# Patient Record
Sex: FEMALE | Race: WHITE | NOT HISPANIC OR LATINO | Employment: FULL TIME | URBAN - METROPOLITAN AREA
[De-identification: names, ages, dates, MRNs, and addresses within clinical notes are randomized per-mention and may not be internally consistent; named-entity substitution may affect disease eponyms.]

---

## 2017-08-02 ENCOUNTER — HOSPITAL ENCOUNTER (OUTPATIENT)
Dept: RADIOLOGY | Facility: HOSPITAL | Age: 52
Discharge: HOME | End: 2017-08-02
Payer: COMMERCIAL

## 2017-08-02 ENCOUNTER — OFFICE VISIT (OUTPATIENT)
Dept: FAMILY MEDICINE | Facility: CLINIC | Age: 52
End: 2017-08-02
Payer: COMMERCIAL

## 2017-08-02 VITALS
WEIGHT: 136.38 LBS | OXYGEN SATURATION: 95 % | BODY MASS INDEX: 23.28 KG/M2 | TEMPERATURE: 100 F | HEIGHT: 64 IN | HEART RATE: 113 BPM | DIASTOLIC BLOOD PRESSURE: 56 MMHG | SYSTOLIC BLOOD PRESSURE: 118 MMHG

## 2017-08-02 DIAGNOSIS — J40 BRONCHITIS: ICD-10-CM

## 2017-08-02 DIAGNOSIS — R35.0 FREQUENT URINATION: ICD-10-CM

## 2017-08-02 DIAGNOSIS — J18.9 PNEUMONIA OF LEFT LOWER LOBE DUE TO INFECTIOUS ORGANISM: Primary | ICD-10-CM

## 2017-08-02 LAB

## 2017-08-02 PROCEDURE — 71020 XR CHEST PA AND LATERAL: CPT | Mod: 26,,, | Performed by: RADIOLOGY

## 2017-08-02 PROCEDURE — 96372 THER/PROPH/DIAG INJ SC/IM: CPT | Mod: S$GLB,,,

## 2017-08-02 PROCEDURE — 81003 URINALYSIS AUTO W/O SCOPE: CPT

## 2017-08-02 PROCEDURE — 71020 XR CHEST PA AND LATERAL: CPT | Mod: TC,PO

## 2017-08-02 PROCEDURE — 99999 PR PBB SHADOW E&M-EST. PATIENT-LVL V: CPT | Mod: PBBFAC,,,

## 2017-08-02 PROCEDURE — 99213 OFFICE O/P EST LOW 20 MIN: CPT | Mod: 25,S$GLB,,

## 2017-08-02 PROCEDURE — 87086 URINE CULTURE/COLONY COUNT: CPT

## 2017-08-02 RX ORDER — BENZONATATE 100 MG/1
100 CAPSULE ORAL
COMMUNITY
Start: 2017-07-28 | End: 2017-08-11

## 2017-08-02 RX ORDER — METHYLPREDNISOLONE ACETATE 80 MG/ML
80 INJECTION, SUSPENSION INTRA-ARTICULAR; INTRALESIONAL; INTRAMUSCULAR; SOFT TISSUE
Status: COMPLETED | OUTPATIENT
Start: 2017-08-02 | End: 2017-08-02

## 2017-08-02 RX ORDER — PROMETHAZINE HYDROCHLORIDE AND DEXTROMETHORPHAN HYDROBROMIDE 6.25; 15 MG/5ML; MG/5ML
5 SYRUP ORAL 3 TIMES DAILY PRN
Qty: 180 ML | Refills: 0 | Status: SHIPPED | OUTPATIENT
Start: 2017-08-02 | End: 2017-08-12

## 2017-08-02 RX ORDER — TOPIRAMATE 50 MG/1
50 TABLET, FILM COATED ORAL NIGHTLY
COMMUNITY
Start: 2017-07-06 | End: 2018-12-10

## 2017-08-02 RX ORDER — TOPIRAMATE 25 MG/1
50 TABLET ORAL
COMMUNITY
End: 2017-08-02 | Stop reason: SDUPTHER

## 2017-08-02 RX ORDER — DOXYCYCLINE HYCLATE 100 MG
100 TABLET ORAL 2 TIMES DAILY
Qty: 20 TABLET | Refills: 0 | Status: SHIPPED | OUTPATIENT
Start: 2017-08-02 | End: 2017-08-11

## 2017-08-02 RX ADMIN — METHYLPREDNISOLONE ACETATE 80 MG: 80 INJECTION, SUSPENSION INTRA-ARTICULAR; INTRALESIONAL; INTRAMUSCULAR; SOFT TISSUE at 03:08

## 2017-08-02 NOTE — PROGRESS NOTES
Subjective:       Patient ID: Kamilla Leavitt is a 52 y.o. female.    Chief Complaint: Generalized Body Aches and Cough    HPI   Presents today with a one-week complaint of a cough that is constant and moderate in intensity.  She voices some associated wheezing and some very mild shortness of breath.  She denies any fever.  She also voices an associated postnasal drip and some rhinorrhea along with diffuse body aches.  Patient cannot identify exacerbating or mitigating factors.    The patient also voices some frequent urination.  She was checked for UTI at urgent care about 5 days ago and was found to have no signs of UTI.  She voices no associated symptoms.  She cannot identify exacerbating or mitigating factors.    Review of Systems   Constitutional: Negative for activity change, appetite change, fatigue, fever and unexpected weight change.   HENT: Positive for postnasal drip and rhinorrhea. Negative for congestion.    Eyes: Negative.    Respiratory: Positive for cough, shortness of breath and wheezing. Negative for chest tightness.    Cardiovascular: Negative for chest pain, palpitations and leg swelling.   Gastrointestinal: Negative for constipation, diarrhea, nausea and vomiting.   Endocrine: Negative.    Genitourinary: Negative.    Musculoskeletal: Positive for myalgias (diffuse).   Skin: Negative for color change.   Allergic/Immunologic: Negative.    Neurological: Negative for dizziness, weakness and light-headedness.   Hematological: Negative.    Psychiatric/Behavioral: Negative for sleep disturbance.         Objective:      Physical Exam   Constitutional: She is oriented to person, place, and time. She appears well-developed and well-nourished. No distress.   HENT:   Head: Normocephalic and atraumatic. Hair is normal.   Right Ear: Hearing, tympanic membrane, external ear and ear canal normal.   Left Ear: Hearing, tympanic membrane, external ear and ear canal normal.   Nose: Mucosal edema and rhinorrhea  present. No nose lacerations, sinus tenderness, nasal deformity, septal deviation or nasal septal hematoma. No epistaxis.  No foreign bodies. Right sinus exhibits no maxillary sinus tenderness and no frontal sinus tenderness. Left sinus exhibits no maxillary sinus tenderness and no frontal sinus tenderness.   Mouth/Throat: Uvula is midline, oropharynx is clear and moist and mucous membranes are normal.   Pale nasal mucosa     Eyes: Conjunctivae are normal. Pupils are equal, round, and reactive to light. Right eye exhibits no discharge. Left eye exhibits no discharge.   Neck: Trachea normal, normal range of motion and phonation normal. Neck supple. No tracheal deviation present.   Cardiovascular: Normal rate, regular rhythm, normal heart sounds and intact distal pulses.  Exam reveals no gallop and no friction rub.    No murmur heard.  Pulmonary/Chest: Effort normal. No respiratory distress. She has no decreased breath sounds. She has wheezes in the right upper field and the left upper field. She has no rhonchi. She has rales in the left lower field. She exhibits no tenderness.   Musculoskeletal: Normal range of motion.   Lymphadenopathy:        Head (right side): No submental, no submandibular, no tonsillar, no preauricular, no posterior auricular and no occipital adenopathy present.        Head (left side): No submental, no submandibular, no tonsillar, no preauricular, no posterior auricular and no occipital adenopathy present.     She has no cervical adenopathy.        Right cervical: No superficial cervical, no deep cervical and no posterior cervical adenopathy present.       Left cervical: No superficial cervical, no deep cervical and no posterior cervical adenopathy present.   Neurological: She is alert and oriented to person, place, and time. She exhibits normal muscle tone. GCS eye subscore is 4. GCS verbal subscore is 5. GCS motor subscore is 6.   Skin: Skin is warm and dry. No rash noted. She is not  diaphoretic. No erythema. No pallor.   Psychiatric: She has a normal mood and affect. Her speech is normal and behavior is normal. Judgment and thought content normal.       Assessment:       1. Pneumonia of left lower lobe due to infectious organism    2. Frequent urination          Plan:   Pneumonia of left lower lobe due to infectious organism  -     X-Ray Chest PA And Lateral; Future; Expected date: 08/02/2017  -     methylPREDNISolone acetate injection 80 mg; Inject 1 mL (80 mg total) into the muscle one time.  -     promethazine-dextromethorphan (PROMETHAZINE-DM) 6.25-15 mg/5 mL Syrp; Take 5 mLs by mouth 3 (three) times daily as needed.  Dispense: 180 mL; Refill: 0    Frequent urination  -     Urinalysis  -     Urine culture            Disclaimer: This note is prepared using voice recognition software.  As such there may be errors in the dictation.  It has not been proofread.

## 2017-08-02 NOTE — LETTER
August 2, 2017      [facility]  [address]  [facility] [address]  Phone: [phone]  Fax: [phone]       Patient: Kamilla Leavitt   YOB: 1965  Date of Visit: 08/02/2017    To Whom It May Concern:    Kamilla Castro was at [facility] on 08/02/2017. She may return to work/school on   08/06/2017 with no restrictions. If you have any questions or concerns, or if I can be of further assistance, please do not hesitate to contact me.    Sincerely,    JANUSZ Rajan

## 2017-08-02 NOTE — PROGRESS NOTES
Pt methylprednisolone acetate injection 80 mg/ ml IM left ventrogluteal. Pt advised to wait 15 minutes to observe for adverse reaction. Pt tolerated well.

## 2017-08-03 ENCOUNTER — TELEPHONE (OUTPATIENT)
Dept: FAMILY MEDICINE | Facility: CLINIC | Age: 52
End: 2017-08-03

## 2017-08-03 LAB — BACTERIA UR CULT: NORMAL

## 2017-08-03 RX ORDER — PROMETHAZINE HYDROCHLORIDE 6.25 MG/5ML
6.25 SYRUP ORAL EVERY 8 HOURS PRN
Qty: 118 ML | Refills: 0 | Status: SHIPPED | OUTPATIENT
Start: 2017-08-03 | End: 2017-08-13

## 2017-08-03 NOTE — TELEPHONE ENCOUNTER
----- Message from Emiliano Han sent at 8/3/2017  9:36 AM CDT -----  Contact: [facility]  States she is calling rg following up on previous phone call questions on the meds that were sent over on yesterday and can be reached at [phone]//thanks/dbw

## 2017-08-03 NOTE — TELEPHONE ENCOUNTER
----- Message from Nichol Ramirez sent at 8/2/2017  3:35 PM CDT -----  Contact[email or web address]  States she's calling regarding the prescription for promethazine DM. States they've been having trouble getting it from there manufacture and they want to see if they can change it too plain promethazine, promethazine w/ codeine or Cheratussin. Please call Reina at [phone]. Thank you

## 2017-08-03 NOTE — TELEPHONE ENCOUNTER
Called and spoken with pharmacist regarding prescription, medication has been changed via Baljit request.

## 2017-08-03 NOTE — TELEPHONE ENCOUNTER
No, she is allergic to codeine. I advise she get some OTC Delsym and I will prescribe some promethazine to dora with it, she should take it no more frequently than the delsym will allow.

## 2017-08-04 ENCOUNTER — TELEPHONE (OUTPATIENT)
Dept: FAMILY MEDICINE | Facility: CLINIC | Age: 52
End: 2017-08-04

## 2017-08-04 NOTE — TELEPHONE ENCOUNTER
----- Message from Indio Cook sent at 8/4/2017  4:02 PM CDT -----  Contact: [phone]  Pt would like to consult with nurse medication causing her to vomit. Has questions about another med she is taking not sure of how much to take. Please call back at [phone]. Thx. jillian

## 2017-08-04 NOTE — TELEPHONE ENCOUNTER
Spoke with pt and she wanted to know if she can take promethazine and Delsym both, they are both for cough . She can alternate them, the Promethazine will make her sleepy so she may only want to take it at night. She voiced understanding .  The Doxy is making her vomit if she takes it on an empty stomach, she says that if she

## 2017-08-08 ENCOUNTER — TELEPHONE (OUTPATIENT)
Dept: FAMILY MEDICINE | Facility: CLINIC | Age: 52
End: 2017-08-08

## 2017-08-08 DIAGNOSIS — N32.81 OVERACTIVE BLADDER: Primary | ICD-10-CM

## 2017-08-08 RX ORDER — OXYBUTYNIN CHLORIDE 5 MG/1
5 TABLET ORAL 3 TIMES DAILY
Qty: 90 TABLET | Refills: 11 | Status: SHIPPED | OUTPATIENT
Start: 2017-08-08 | End: 2018-12-10

## 2017-08-08 NOTE — TELEPHONE ENCOUNTER
Her urine was clear when we checked it, she may have an overactive bladder I will call in a prescription for her.  She has an appointment Friday to her to keep it so that we can assess whether the medication is helping or not.

## 2017-08-08 NOTE — TELEPHONE ENCOUNTER
----- Message from Indio Cook sent at 8/7/2017 10:14 AM CDT -----  Contact: [phone]  Would like to consult with nurse about hot flashes. Urinated on herself trying to make it to bathroom having problems holding. Has questions.please call back at [phone]. Thx. jillian

## 2017-08-11 ENCOUNTER — OFFICE VISIT (OUTPATIENT)
Dept: FAMILY MEDICINE | Facility: CLINIC | Age: 52
End: 2017-08-11
Payer: COMMERCIAL

## 2017-08-11 VITALS
HEART RATE: 68 BPM | SYSTOLIC BLOOD PRESSURE: 102 MMHG | BODY MASS INDEX: 23.26 KG/M2 | WEIGHT: 136.25 LBS | HEIGHT: 64 IN | TEMPERATURE: 97 F | OXYGEN SATURATION: 98 % | DIASTOLIC BLOOD PRESSURE: 66 MMHG

## 2017-08-11 DIAGNOSIS — J18.9 PNEUMONIA OF LEFT LOWER LOBE DUE TO INFECTIOUS ORGANISM: Primary | ICD-10-CM

## 2017-08-11 PROCEDURE — 3008F BODY MASS INDEX DOCD: CPT | Mod: S$GLB,,,

## 2017-08-11 PROCEDURE — 99999 PR PBB SHADOW E&M-EST. PATIENT-LVL III: CPT | Mod: PBBFAC,,,

## 2017-08-11 PROCEDURE — 99212 OFFICE O/P EST SF 10 MIN: CPT | Mod: S$GLB,,,

## 2017-08-11 NOTE — PROGRESS NOTES
Subjective:       Patient ID: Kamilla Leavitt is a 52 y.o. female.    Chief Complaint: Follow-up    HPI   Maria D today for follow-up for left lower lobe pneumonia.  She states she is feeling better and needs a letter to go back to work.  She says her cough has improved greatly cough medication is working she is taking her antibiotics as prescribed she has 2 days left of antibiotics.  She says she had to take her Cymbalta today because they do cause diarrhea and she cannot take Imodium due to her Crohn's disease.  She was advised to be some cultured yogurt or try some probiotics in order to assuage the diarrhea.  She denies any other complaints and states overall she feels much better    Review of Systems   Constitutional: Negative for activity change, appetite change, fatigue and unexpected weight change.   HENT: Negative.    Eyes: Negative.    Respiratory: Negative for cough, chest tightness, shortness of breath and wheezing.    Cardiovascular: Negative for chest pain, palpitations and leg swelling.   Gastrointestinal: Positive for diarrhea. Negative for constipation, nausea and vomiting.   Endocrine: Negative.    Genitourinary: Negative.    Musculoskeletal: Negative.    Skin: Negative for color change.   Allergic/Immunologic: Negative.    Neurological: Negative for dizziness, weakness and light-headedness.   Hematological: Negative.    Psychiatric/Behavioral: Negative for sleep disturbance.         Objective:      Physical Exam   Constitutional: She is oriented to person, place, and time. She appears well-developed and well-nourished.   HENT:   Head: Normocephalic and atraumatic.   Eyes: Conjunctivae are normal. Pupils are equal, round, and reactive to light. No scleral icterus.   Neck: Normal range of motion. Neck supple.   Cardiovascular: Normal rate, regular rhythm, normal heart sounds and intact distal pulses.  Exam reveals no gallop and no friction rub.    No murmur heard.  Pulmonary/Chest: Effort normal and  breath sounds normal. No respiratory distress. She has no wheezes. She has no rales. She exhibits no tenderness.   Musculoskeletal: Normal range of motion.   Lymphadenopathy:     She has no cervical adenopathy.   Neurological: She is alert and oriented to person, place, and time. She exhibits normal muscle tone. Coordination normal.   Skin: Skin is warm and dry. No rash noted. No erythema. No pallor.   Psychiatric: She has a normal mood and affect. Her behavior is normal. Judgment and thought content normal.   Vitals reviewed.      Assessment:       1. Pneumonia of left lower lobe due to infectious organism          Plan:   Pneumonia of left lower lobe due to infectious organism       -     Patient given a letter to return to work, she will follow-up in 1 week for chest x-ray          Disclaimer: This note is prepared using voice recognition software.  As such there may be errors in the dictation.  It has not been proofread.

## 2017-08-11 NOTE — LETTER
August 11, 2017      [facility]  [address]  [facility] [address]  Phone: [phone]  Fax: [phone]       Patient: Kamilla Leavitt   YOB: 1965  Date of Visit: 08/11/2017    To Whom It May Concern:    Negin Leavitt  was at [facility] on 08/11/2017. She may return to work/school on 08/14/2017 with restrictions. She needs to restrict her hours to 40 per week for at least 2 weeks.  If you have any questions or concerns, or if I can be of further assistance, please do not hesitate to contact me.    Sincerely,    JANUSZ Rajan

## 2017-08-16 ENCOUNTER — HOSPITAL ENCOUNTER (OUTPATIENT)
Dept: RADIOLOGY | Facility: HOSPITAL | Age: 52
Discharge: HOME | End: 2017-08-16
Payer: COMMERCIAL

## 2017-08-16 DIAGNOSIS — J18.9 PNEUMONIA OF LEFT LOWER LOBE DUE TO INFECTIOUS ORGANISM: ICD-10-CM

## 2017-08-16 PROCEDURE — 71020 XR CHEST PA AND LATERAL: CPT | Mod: 26,,, | Performed by: RADIOLOGY

## 2017-08-16 PROCEDURE — 71020 XR CHEST PA AND LATERAL: CPT | Mod: TC,PO

## 2018-01-10 ENCOUNTER — HOSPITAL ENCOUNTER (EMERGENCY)
Facility: HOSPITAL | Age: 53
Discharge: HOME | End: 2018-01-10
Attending: EMERGENCY MEDICINE
Payer: COMMERCIAL

## 2018-01-10 VITALS
BODY MASS INDEX: 21.17 KG/M2 | HEART RATE: 65 BPM | OXYGEN SATURATION: 100 % | RESPIRATION RATE: 15 BRPM | HEIGHT: 64 IN | TEMPERATURE: 98 F | SYSTOLIC BLOOD PRESSURE: 127 MMHG | DIASTOLIC BLOOD PRESSURE: 65 MMHG | WEIGHT: 124 LBS

## 2018-01-10 DIAGNOSIS — R07.9 CHEST PAIN: ICD-10-CM

## 2018-01-10 LAB
ALBUMIN SERPL BCP-MCNC: 3.7 G/DL
ALP SERPL-CCNC: 70 U/L
ALT SERPL W/O P-5'-P-CCNC: 21 U/L
ANION GAP SERPL CALC-SCNC: 8 MMOL/L
AST SERPL-CCNC: 16 U/L
BASOPHILS # BLD AUTO: 0.02 K/UL
BASOPHILS NFR BLD: 0.3 %
BILIRUB SERPL-MCNC: 0.2 MG/DL
BNP SERPL-MCNC: 18 PG/ML
BUN SERPL-MCNC: 15 MG/DL
CALCIUM SERPL-MCNC: 9.6 MG/DL
CHLORIDE SERPL-SCNC: 108 MMOL/L
CK MB SERPL-MCNC: 0.6 NG/ML
CK MB SERPL-RTO: 1.4 %
CK SERPL-CCNC: 44 U/L
CK SERPL-CCNC: 44 U/L
CO2 SERPL-SCNC: 26 MMOL/L
CREAT SERPL-MCNC: 0.8 MG/DL
DIFFERENTIAL METHOD: NORMAL
EOSINOPHIL # BLD AUTO: 0.2 K/UL
EOSINOPHIL NFR BLD: 2.9 %
ERYTHROCYTE [DISTWIDTH] IN BLOOD BY AUTOMATED COUNT: 13 %
EST. GFR  (AFRICAN AMERICAN): >60 ML/MIN/1.73 M^2
EST. GFR  (NON AFRICAN AMERICAN): >60 ML/MIN/1.73 M^2
GLUCOSE SERPL-MCNC: 97 MG/DL
HCT VFR BLD AUTO: 38 %
HGB BLD-MCNC: 12.7 G/DL
LIPASE SERPL-CCNC: 30 U/L
LYMPHOCYTES # BLD AUTO: 2.5 K/UL
LYMPHOCYTES NFR BLD: 37.9 %
MAGNESIUM SERPL-MCNC: 2.1 MG/DL
MCH RBC QN AUTO: 28.1 PG
MCHC RBC AUTO-ENTMCNC: 33.4 G/DL
MCV RBC AUTO: 84 FL
MONOCYTES # BLD AUTO: 0.5 K/UL
MONOCYTES NFR BLD: 7.4 %
NEUTROPHILS # BLD AUTO: 3.4 K/UL
NEUTROPHILS NFR BLD: 51.5 %
PLATELET # BLD AUTO: 287 K/UL
PMV BLD AUTO: 10.2 FL
POTASSIUM SERPL-SCNC: 3.8 MMOL/L
PROT SERPL-MCNC: 7.3 G/DL
RBC # BLD AUTO: 4.52 M/UL
SODIUM SERPL-SCNC: 142 MMOL/L
TROPONIN I SERPL DL<=0.01 NG/ML-MCNC: <0.006 NG/ML
WBC # BLD AUTO: 6.59 K/UL

## 2018-01-10 PROCEDURE — 93010 ELECTROCARDIOGRAM REPORT: CPT | Mod: ,,, | Performed by: INTERNAL MEDICINE

## 2018-01-10 PROCEDURE — 83880 ASSAY OF NATRIURETIC PEPTIDE: CPT

## 2018-01-10 PROCEDURE — 85025 COMPLETE CBC W/AUTO DIFF WBC: CPT

## 2018-01-10 PROCEDURE — 25000003 PHARM REV CODE 250: Performed by: EMERGENCY MEDICINE

## 2018-01-10 PROCEDURE — 96374 THER/PROPH/DIAG INJ IV PUSH: CPT

## 2018-01-10 PROCEDURE — 84484 ASSAY OF TROPONIN QUANT: CPT

## 2018-01-10 PROCEDURE — 99284 EMERGENCY DEPT VISIT MOD MDM: CPT | Mod: 25

## 2018-01-10 PROCEDURE — 80053 COMPREHEN METABOLIC PANEL: CPT

## 2018-01-10 PROCEDURE — 82553 CREATINE MB FRACTION: CPT

## 2018-01-10 PROCEDURE — 83690 ASSAY OF LIPASE: CPT

## 2018-01-10 PROCEDURE — 63600175 PHARM REV CODE 636 W HCPCS: Performed by: EMERGENCY MEDICINE

## 2018-01-10 PROCEDURE — 83735 ASSAY OF MAGNESIUM: CPT

## 2018-01-10 RX ORDER — METHYLPREDNISOLONE SOD SUCC 125 MG
125 VIAL (EA) INJECTION
Status: COMPLETED | OUTPATIENT
Start: 2018-01-10 | End: 2018-01-10

## 2018-01-10 RX ORDER — METHYLPREDNISOLONE 4 MG/1
TABLET ORAL
Qty: 1 PACKAGE | Refills: 0 | Status: SHIPPED | OUTPATIENT
Start: 2018-01-10 | End: 2018-01-31

## 2018-01-10 RX ADMIN — METHYLPREDNISOLONE SODIUM SUCCINATE 125 MG: 125 INJECTION, POWDER, FOR SOLUTION INTRAMUSCULAR; INTRAVENOUS at 09:01

## 2018-01-10 RX ADMIN — DICYCLOMINE HYDROCHLORIDE 50 ML: 10 SOLUTION ORAL at 08:01

## 2018-01-11 NOTE — ED PROVIDER NOTES
SCRIBE #1 NOTE: I, Laureen Pan, am scribing for, and in the presence of, Hal Lim MD. I have scribed the entire note.      History      Chief Complaint   Patient presents with    Chest Pain     midsternal, radiates to R arm.        Review of patient's allergies indicates:   Allergen Reactions    Codeine Hives and Nausea Only        HPI   HPI    1/10/2018, 7:15 PM   History obtained from the patient      History of Present Illness: Kamilla Leavitt is a 52 y.o. female patient who has a hx of Chron's disease presents to the Emergency Department for midsternal CP which onset suddenly two days ago. Symptoms are constant and moderate in severity. Pt reports that her CP is radiating into her R arm. She is starting to feel numbness and tingling. Pt reports that her CP is getting worse. No other associated sxs reported. Patient denies any SOB, diaphoresis, palpitations, leg pain/swelling, dizziness,cough, n/v and all other sxs at this time. Pt reports that she had a Chron flare up on Sunday. Both of pt's parents had a heart attack. No further complaints or concerns at this time.         Arrival mode: Personal vehicle    PCP: Carmen Shea MD       Past Medical History:  History reviewed. No pertinent past medical history.    Past Surgical History:  History reviewed. No pertinent surgical history.      Family History:  Family History   Problem Relation Age of Onset    Cancer Neg Hx        Social History:  Social History     Social History Main Topics    Smoking status: Never Smoker    Smokeless tobacco: Never Used    Alcohol use No    Drug use: No    Sexual activity: Not given     ROS   Review of Systems   Constitutional: Negative for diaphoresis and fever.   HENT: Negative for sore throat.    Respiratory: Negative for cough and shortness of breath.    Cardiovascular: Positive for chest pain (midsternal and radiating to R arm). Negative for palpitations and leg swelling.   Gastrointestinal: Negative for  "abdominal pain, diarrhea, nausea and vomiting.   Genitourinary: Negative for dysuria.   Musculoskeletal: Negative for back pain.   Skin: Negative for rash.   Neurological: Positive for numbness (in R arm). Negative for dizziness, weakness and headaches.   Hematological: Does not bruise/bleed easily.   All other systems reviewed and are negative.    Physical Exam      Initial Vitals [01/10/18 1805]   BP Pulse Resp Temp SpO2   (!) 148/87 81 18 98 °F (36.7 °C) 100 %      MAP       107.33          Physical Exam  Nursing Notes and Vital Signs Reviewed.  Constitutional: Patient is in no apparent distress. Well-developed and well-nourished.  Head: Atraumatic. Normocephalic.  Eyes: PERRL. EOM intact. Conjunctivae are not pale. No scleral icterus.  ENT: Mucous membranes are moist. Oropharynx is clear and symmetric.    Neck: Supple. Full ROM. No lymphadenopathy.  Cardiovascular: Regular rate. Regular rhythm. No murmurs, rubs, or gallops. Distal pulses are 2+ and symmetric.  Pulmonary/Chest: No respiratory distress. Clear to auscultation bilaterally. No wheezing or rales.  Abdominal: Soft and non-distended.  There is no tenderness.  No rebound, guarding, or rigidity. Good bowel sounds.  Genitourinary: No CVA tenderness  Musculoskeletal: Moves all extremities. No obvious deformities. No edema. No calf tenderness.  Skin: Warm and dry.  Neurological:  Alert, awake, and appropriate.  Normal speech.  No acute focal neurological deficits are appreciated.  Psychiatric: Normal affect. Good eye contact. Appropriate in content.    ED Course    Procedures  ED Vital Signs:  Vitals:    01/10/18 1805 01/10/18 1926 01/10/18 1937 01/10/18 2032   BP: (!) 148/87 128/83  134/71   Pulse: 81 69 86 73   Resp: 18 18  19   Temp: 98 °F (36.7 °C)      TempSrc: Oral      SpO2: 100% 100%  98%   Weight: 56.2 kg (124 lb)      Height: 5' 4" (1.626 m)       01/10/18 2118 01/10/18 2124   BP:  127/65   Pulse:  65   Resp:  15   Temp: 98 °F (36.7 °C)    TempSrc: " Oral    SpO2:  100%   Weight:     Height:         Abnormal Lab Results:  Labs Reviewed   CK   CK-MB   TROPONIN I   MAGNESIUM   B-TYPE NATRIURETIC PEPTIDE   LIPASE   CBC W/ AUTO DIFFERENTIAL   COMPREHENSIVE METABOLIC PANEL        All Lab Results:  Results for orders placed or performed during the hospital encounter of 01/10/18   CK   Result Value Ref Range    CPK 44 20 - 180 U/L   CK-MB   Result Value Ref Range    CPK 44 20 - 180 U/L    CPK MB 0.6 0.1 - 6.5 ng/mL    MB% 1.4 0.0 - 5.0 %   Troponin I   Result Value Ref Range    Troponin I <0.006 0.000 - 0.026 ng/mL   Magnesium   Result Value Ref Range    Magnesium 2.1 1.6 - 2.6 mg/dL   Brain natriuretic peptide   Result Value Ref Range    BNP 18 0 - 99 pg/mL   Lipase   Result Value Ref Range    Lipase 30 4 - 60 U/L   CBC auto differential   Result Value Ref Range    WBC 6.59 3.90 - 12.70 K/uL    RBC 4.52 4.00 - 5.40 M/uL    Hemoglobin 12.7 12.0 - 16.0 g/dL    Hematocrit 38.0 37.0 - 48.5 %    MCV 84 82 - 98 fL    MCH 28.1 27.0 - 31.0 pg    MCHC 33.4 32.0 - 36.0 g/dL    RDW 13.0 11.5 - 14.5 %    Platelets 287 150 - 350 K/uL    MPV 10.2 9.2 - 12.9 fL    Gran # 3.4 1.8 - 7.7 K/uL    Lymph # 2.5 1.0 - 4.8 K/uL    Mono # 0.5 0.3 - 1.0 K/uL    Eos # 0.2 0.0 - 0.5 K/uL    Baso # 0.02 0.00 - 0.20 K/uL    Gran% 51.5 38.0 - 73.0 %    Lymph% 37.9 18.0 - 48.0 %    Mono% 7.4 4.0 - 15.0 %    Eosinophil% 2.9 0.0 - 8.0 %    Basophil% 0.3 0.0 - 1.9 %    Differential Method Automated    Comprehensive metabolic panel   Result Value Ref Range    Sodium 142 136 - 145 mmol/L    Potassium 3.8 3.5 - 5.1 mmol/L    Chloride 108 95 - 110 mmol/L    CO2 26 23 - 29 mmol/L    Glucose 97 70 - 110 mg/dL    BUN, Bld 15 6 - 20 mg/dL    Creatinine 0.8 0.5 - 1.4 mg/dL    Calcium 9.6 8.7 - 10.5 mg/dL    Total Protein 7.3 6.0 - 8.4 g/dL    Albumin 3.7 3.5 - 5.2 g/dL    Total Bilirubin 0.2 0.1 - 1.0 mg/dL    Alkaline Phosphatase 70 55 - 135 U/L    AST 16 10 - 40 U/L    ALT 21 10 - 44 U/L    Anion Gap 8 8 - 16  mmol/L    eGFR if African American >60 >60 mL/min/1.73 m^2    eGFR if non African American >60 >60 mL/min/1.73 m^2     Imaging Results:  Imaging Results          X-Ray Chest PA And Lateral (Final result)  Result time 01/10/18 20:18:43    Final result by Damon Caballero MD (01/10/18 20:18:43)                 Impression:         No acute cardiopulmonary disease      Electronically signed by: DAMON CABALLERO MD  Date:     01/10/18  Time:    20:18              Narrative:    Exam: Two-view chest xray    History:     Chest Pain     Findings:     The heart is normal and the lungs are clear.                             The EKG was ordered, reviewed, and independently interpreted by the ED provider.  Interpretation time: 18:17  Rate: 69 BPM  Rhythm: normal sinus rhythm  Interpretation: Normal ECG. No STEMI.         The Emergency Provider reviewed the vital signs and test results, which are outlined above.    ED Discussion   8:36 PM: Dr. HEVER Lim discussed with pt taking a GI cocktail. Pt would like to try it to see if it would alleviate her pain.    9:29 PM: Reassessed pt at this time. Upon re-examination pt is still have chest wall tenderness with palpation.  Discussed with pt all pertinent ED information and results. Discussed pt dx and plan of tx. Gave pt all f/u and return to the ED instructions. All questions and concerns were addressed at this time. Pt expresses understanding of information and instructions, and is comfortable with plan to discharge. Pt is stable for discharge.    I discussed with patient and/or family/caretaker that evaluation in the ED does not suggest any emergent or life threatening medical conditions requiring immediate intervention beyond what was provided in the ED, and I believe patient is safe for discharge.  Regardless, an unremarkable evaluation in the ED does not preclude the development or presence of a serious of life threatening condition. As such, patient was instructed to return immediately  for any worsening or change in current symptoms.    ED Medication(s):  Medications   GI cocktail (mylanta 30 mL, lidocaine 2 % viscous 10 mL, dicyclomine 10 mL) 50 mL (50 mLs Oral Given 1/10/18 2048)   methylPREDNISolone sodium succinate injection 125 mg (125 mg Intravenous Given 1/10/18 2121)       Discharge Medication List as of 1/10/2018  9:20 PM      START taking these medications    Details   methylPREDNISolone (MEDROL DOSEPACK) 4 mg tablet As directed, Normal             Follow-up Information     Carmen Shea MD. Schedule an appointment as soon as possible for a visit in 2 days.    Specialty:  Family Medicine  Why:  Can return to the ER, If symptoms worsen  Contact information:  [address] [address] [id number] [phone]                     Medical Decision Making    Medical Decision Making:   Clinical Tests:   Lab Tests: Reviewed and Ordered  Radiological Study: Reviewed and Ordered  Medical Tests: Reviewed and Ordered           Scribe Attestation:   Scribe #1: I performed the above scribed service and the documentation accurately describes the services I performed. I attest to the accuracy of the note.    Attending:   Physician Attestation Statement for Scribe #1: I, Hal Lim MD, personally performed the services described in this documentation, as scribed by Laureen Perla, in my presence, and it is both accurate and complete.          Clinical Impression       ICD-10-CM ICD-9-CM   1. Chest pain R07.9 786.50       Disposition:   Disposition: Discharged  Condition: Stable         Hal Lim MD  01/11/18 9274

## 2018-01-12 ENCOUNTER — OFFICE VISIT (OUTPATIENT)
Dept: FAMILY MEDICINE | Facility: CLINIC | Age: 53
End: 2018-01-12
Payer: COMMERCIAL

## 2018-01-12 VITALS
DIASTOLIC BLOOD PRESSURE: 73 MMHG | RESPIRATION RATE: 18 BRPM | OXYGEN SATURATION: 99 % | HEIGHT: 64 IN | SYSTOLIC BLOOD PRESSURE: 136 MMHG | HEART RATE: 78 BPM | TEMPERATURE: 98 F | WEIGHT: 139.13 LBS | BODY MASS INDEX: 23.75 KG/M2

## 2018-01-12 DIAGNOSIS — M94.0 COSTOCHONDRITIS: Primary | ICD-10-CM

## 2018-01-12 PROCEDURE — 99213 OFFICE O/P EST LOW 20 MIN: CPT | Mod: S$GLB,,,

## 2018-01-12 PROCEDURE — 99999 PR PBB SHADOW E&M-EST. PATIENT-LVL IV: CPT | Mod: PBBFAC,,,

## 2018-01-12 RX ORDER — DICLOFENAC SODIUM 10 MG/G
2 GEL TOPICAL 2 TIMES DAILY
Qty: 100 G | Refills: 1 | Status: SHIPPED | OUTPATIENT
Start: 2018-01-12 | End: 2018-12-10

## 2018-01-12 NOTE — PROGRESS NOTES
Subjective:       Patient ID: Kamilla Leavitt is a 52 y.o. female.    Chief Complaint: Chest Pain    HPI   The patient presents today for follow-up from the emergency Department where she went to days ago and was diagnosed with costochondritis.  She presents with a complaint of chest pain that she describes as an intermittent waxing and waning achiness along her sternal border.  She voices some associated numbness in her arms.  She says this is intermittent and also she denies any shortness of breath, nausea, or diaphoresis.  Her EKG in the emergency department was normal sinus rhythm with no ectopy or ST elevations noted.  Her cardiac enzymes were all negative.  She was diagnosed with costochondritis prescribed Medrol Dosepak and discharged home.  She resents today for follow-up.    Review of Systems   Constitutional: Negative for activity change, appetite change, fatigue and unexpected weight change.   HENT: Negative.    Eyes: Negative.    Respiratory: Negative for cough, chest tightness, shortness of breath and wheezing.    Cardiovascular: Positive for chest pain. Negative for palpitations and leg swelling.   Gastrointestinal: Negative for constipation, diarrhea, nausea and vomiting.   Endocrine: Negative.    Genitourinary: Negative.    Musculoskeletal: Negative.    Skin: Negative for color change.   Allergic/Immunologic: Negative.    Neurological: Negative for dizziness, weakness and light-headedness.   Hematological: Negative.    Psychiatric/Behavioral: Negative for sleep disturbance.         Objective:      Physical Exam   Constitutional: She is oriented to person, place, and time. She appears well-developed and well-nourished.   HENT:   Head: Normocephalic and atraumatic.   Eyes: Conjunctivae are normal. Pupils are equal, round, and reactive to light. No scleral icterus.   Neck: Normal range of motion. Neck supple.   Cardiovascular: Normal rate, regular rhythm, normal heart sounds and intact distal pulses.   Exam reveals no gallop and no friction rub.    No murmur heard.  Pulses:       Carotid pulses are 2+ on the right side, and 2+ on the left side.       Radial pulses are 2+ on the right side, and 2+ on the left side.        Dorsalis pedis pulses are 2+ on the right side, and 2+ on the left side.        Posterior tibial pulses are 2+ on the right side, and 2+ on the left side.   Pulmonary/Chest: Effort normal and breath sounds normal. No respiratory distress. She has no wheezes. She has no rales. She exhibits no tenderness.       Musculoskeletal: Normal range of motion.   Lymphadenopathy:     She has no cervical adenopathy.   Neurological: She is alert and oriented to person, place, and time. She exhibits normal muscle tone. Coordination normal.   Skin: Skin is warm and dry. No rash noted. No erythema. No pallor.   Psychiatric: She has a normal mood and affect. Her behavior is normal. Judgment and thought content normal.   Vitals reviewed.      Assessment:       1. Costochondritis          Plan:   Costochondritis  -     diclofenac sodium (VOLTAREN) 1 % Gel; Apply 2 g topically 2 (two) times daily.  Dispense: 100 g; Refill: 1            Disclaimer: This note is prepared using voice recognition software.

## 2018-01-12 NOTE — LETTER
January 12, 2018      [facility]  [address]  [facility] [address]  Phone: [phone]  Fax: [phone]       Patient: Kamilla Leavitt   YOB: 1965  Date of Visit: 01/12/2018    To Whom It May Concern:    Negin Leavitt  was at [facility] on 01/12/2018. She may return to work/school on 01/15/2018 with no restrictions. If you have any questions or concerns, or if I can be of further assistance, please do not hesitate to contact me.    Sincerely,    JANUSZ Rajan

## 2018-01-21 ENCOUNTER — PATIENT MESSAGE (OUTPATIENT)
Dept: FAMILY MEDICINE | Facility: CLINIC | Age: 53
End: 2018-01-21

## 2018-03-20 ENCOUNTER — PATIENT OUTREACH (OUTPATIENT)
Dept: ADMINISTRATIVE | Facility: HOSPITAL | Age: 53
End: 2018-03-20

## 2018-03-20 DIAGNOSIS — Z12.31 ENCOUNTER FOR SCREENING MAMMOGRAM FOR MALIGNANT NEOPLASM OF BREAST: Primary | ICD-10-CM

## 2018-03-20 NOTE — PROGRESS NOTES
I spoke with pt that stated it was ok to book her mammogram appt since she has not had one since 2009. Pt also stated her son just had a colonoscopy on her insurance and it cost them $500.00 out of pocket. Pt will need some time to save the money for her to have her colonoscopy and will save up to have that done this year.  Pt see's Dr Mckeon. I have added provider to the care team. Pt verbalized understanding of needed appt.

## 2018-12-10 ENCOUNTER — HOSPITAL ENCOUNTER (EMERGENCY)
Facility: HOSPITAL | Age: 53
Discharge: HOME | End: 2018-12-10
Attending: EMERGENCY MEDICINE
Payer: COMMERCIAL

## 2018-12-10 ENCOUNTER — OFFICE VISIT (OUTPATIENT)
Dept: FAMILY MEDICINE | Facility: CLINIC | Age: 53
End: 2018-12-10
Payer: COMMERCIAL

## 2018-12-10 VITALS
HEART RATE: 68 BPM | TEMPERATURE: 97 F | HEIGHT: 64 IN | RESPIRATION RATE: 20 BRPM | SYSTOLIC BLOOD PRESSURE: 137 MMHG | WEIGHT: 152.69 LBS | BODY MASS INDEX: 26.07 KG/M2 | DIASTOLIC BLOOD PRESSURE: 63 MMHG | OXYGEN SATURATION: 98 %

## 2018-12-10 VITALS
OXYGEN SATURATION: 98 % | TEMPERATURE: 97 F | WEIGHT: 152.44 LBS | BODY MASS INDEX: 26.17 KG/M2 | SYSTOLIC BLOOD PRESSURE: 122 MMHG | HEART RATE: 82 BPM | DIASTOLIC BLOOD PRESSURE: 86 MMHG

## 2018-12-10 DIAGNOSIS — R07.9 CHEST PAIN: ICD-10-CM

## 2018-12-10 DIAGNOSIS — R07.9 CHEST PAIN, UNSPECIFIED TYPE: Primary | ICD-10-CM

## 2018-12-10 DIAGNOSIS — M54.6 ACUTE LEFT-SIDED THORACIC BACK PAIN: ICD-10-CM

## 2018-12-10 LAB
ALBUMIN SERPL BCP-MCNC: 4.1 G/DL
ALP SERPL-CCNC: 66 U/L
ALT SERPL W/O P-5'-P-CCNC: 16 U/L
ANION GAP SERPL CALC-SCNC: 10 MMOL/L
AST SERPL-CCNC: 16 U/L
BASOPHILS # BLD AUTO: 0.01 K/UL
BASOPHILS NFR BLD: 0.2 %
BILIRUB SERPL-MCNC: 0.4 MG/DL
BILIRUB UR QL STRIP: NEGATIVE
BNP SERPL-MCNC: 17 PG/ML
BUN SERPL-MCNC: 13 MG/DL
CALCIUM SERPL-MCNC: 10 MG/DL
CHLORIDE SERPL-SCNC: 104 MMOL/L
CK SERPL-CCNC: 47 U/L
CLARITY UR: CLEAR
CO2 SERPL-SCNC: 27 MMOL/L
COLOR UR: YELLOW
CREAT SERPL-MCNC: 0.7 MG/DL
DIFFERENTIAL METHOD: NORMAL
EOSINOPHIL # BLD AUTO: 0.1 K/UL
EOSINOPHIL NFR BLD: 2.1 %
ERYTHROCYTE [DISTWIDTH] IN BLOOD BY AUTOMATED COUNT: 12.8 %
EST. GFR  (AFRICAN AMERICAN): >60 ML/MIN/1.73 M^2
EST. GFR  (NON AFRICAN AMERICAN): >60 ML/MIN/1.73 M^2
GLUCOSE SERPL-MCNC: 96 MG/DL
GLUCOSE UR QL STRIP: NEGATIVE
HCT VFR BLD AUTO: 40.8 %
HGB BLD-MCNC: 13.3 G/DL
HGB UR QL STRIP: ABNORMAL
KETONES UR QL STRIP: ABNORMAL
LEUKOCYTE ESTERASE UR QL STRIP: NEGATIVE
LYMPHOCYTES # BLD AUTO: 1.7 K/UL
LYMPHOCYTES NFR BLD: 29.5 %
MCH RBC QN AUTO: 28 PG
MCHC RBC AUTO-ENTMCNC: 32.6 G/DL
MCV RBC AUTO: 86 FL
MONOCYTES # BLD AUTO: 0.4 K/UL
MONOCYTES NFR BLD: 6.8 %
NEUTROPHILS # BLD AUTO: 3.4 K/UL
NEUTROPHILS NFR BLD: 61.4 %
NITRITE UR QL STRIP: NEGATIVE
PH UR STRIP: 6 [PH] (ref 5–8)
PLATELET # BLD AUTO: 264 K/UL
PMV BLD AUTO: 10.1 FL
POTASSIUM SERPL-SCNC: 3.8 MMOL/L
PROT SERPL-MCNC: 7.3 G/DL
PROT UR QL STRIP: NEGATIVE
RBC # BLD AUTO: 4.75 M/UL
SODIUM SERPL-SCNC: 141 MMOL/L
SP GR UR STRIP: 1.03 (ref 1–1.03)
TROPONIN I SERPL DL<=0.01 NG/ML-MCNC: <0.006 NG/ML
URN SPEC COLLECT METH UR: ABNORMAL
UROBILINOGEN UR STRIP-ACNC: NEGATIVE EU/DL
WBC # BLD AUTO: 5.59 K/UL

## 2018-12-10 PROCEDURE — 84484 ASSAY OF TROPONIN QUANT: CPT

## 2018-12-10 PROCEDURE — 93005 ELECTROCARDIOGRAM TRACING: CPT | Mod: S$GLB,,, | Performed by: FAMILY MEDICINE

## 2018-12-10 PROCEDURE — 99214 OFFICE O/P EST MOD 30 MIN: CPT | Mod: S$GLB,,, | Performed by: FAMILY MEDICINE

## 2018-12-10 PROCEDURE — 82550 ASSAY OF CK (CPK): CPT

## 2018-12-10 PROCEDURE — 3008F BODY MASS INDEX DOCD: CPT | Mod: CPTII,S$GLB,, | Performed by: FAMILY MEDICINE

## 2018-12-10 PROCEDURE — 99284 EMERGENCY DEPT VISIT MOD MDM: CPT | Mod: 25

## 2018-12-10 PROCEDURE — 80053 COMPREHEN METABOLIC PANEL: CPT

## 2018-12-10 PROCEDURE — 81003 URINALYSIS AUTO W/O SCOPE: CPT

## 2018-12-10 PROCEDURE — 83880 ASSAY OF NATRIURETIC PEPTIDE: CPT

## 2018-12-10 PROCEDURE — 93005 ELECTROCARDIOGRAM TRACING: CPT

## 2018-12-10 PROCEDURE — 85025 COMPLETE CBC W/AUTO DIFF WBC: CPT

## 2018-12-10 PROCEDURE — 93010 ELECTROCARDIOGRAM REPORT: CPT | Mod: ,,, | Performed by: INTERNAL MEDICINE

## 2018-12-10 PROCEDURE — 93010 ELECTROCARDIOGRAM REPORT: CPT | Mod: 77,S$GLB,, | Performed by: NUCLEAR MEDICINE

## 2018-12-10 PROCEDURE — 99999 PR PBB SHADOW E&M-EST. PATIENT-LVL III: CPT | Mod: PBBFAC,,, | Performed by: FAMILY MEDICINE

## 2018-12-10 RX ORDER — TOPIRAMATE 100 MG/1
100 TABLET, FILM COATED ORAL 2 TIMES DAILY
COMMUNITY
End: 2019-02-01

## 2018-12-10 RX ORDER — BUTALBITAL, ACETAMINOPHEN AND CAFFEINE 50; 325; 40 MG/1; MG/1; MG/1
1 TABLET ORAL EVERY 4 HOURS PRN
COMMUNITY
End: 2019-02-12

## 2018-12-10 RX ORDER — NAPROXEN 375 MG/1
375 TABLET ORAL 2 TIMES DAILY WITH MEALS
Qty: 30 TABLET | Refills: 0 | Status: SHIPPED | OUTPATIENT
Start: 2018-12-10 | End: 2019-02-01

## 2018-12-10 RX ORDER — ASPIRIN 325 MG
325 TABLET ORAL
Status: COMPLETED | OUTPATIENT
Start: 2018-12-10 | End: 2018-12-10

## 2018-12-10 RX ADMIN — Medication 325 MG: at 10:12

## 2018-12-10 NOTE — PROGRESS NOTES
Chief Complaint:    Chief Complaint   Patient presents with    Arm Pain     complains of left arm pain    Chest Pain       History of Present Illness:  She presents today she says for the last couple days she has been having pain around the chest comes and goes no aggravating relieving factors.  She has also had some pain in the left shoulder.      ROS:  Review of Systems   Constitutional: Negative for activity change, chills, fatigue, fever and unexpected weight change.   HENT: Negative for congestion, ear discharge, ear pain, hearing loss, postnasal drip and rhinorrhea.    Eyes: Negative for pain and visual disturbance.   Respiratory: Negative for cough, chest tightness and shortness of breath.    Cardiovascular: Positive for chest pain. Negative for palpitations.   Gastrointestinal: Negative for abdominal pain, diarrhea and vomiting.   Endocrine: Negative for heat intolerance.   Genitourinary: Negative for dysuria, flank pain, frequency and hematuria.   Musculoskeletal: Negative for back pain, gait problem and neck pain.   Skin: Negative for color change and rash.   Neurological: Negative for dizziness, tremors, seizures, numbness and headaches.   Psychiatric/Behavioral: Negative for agitation, hallucinations, self-injury, sleep disturbance and suicidal ideas. The patient is not nervous/anxious.        History reviewed. No pertinent past medical history.    Social History:  Social History     Socioeconomic History    Marital status:      Spouse name: None    Number of children: None    Years of education: None    Highest education level: None   Social Needs    Financial resource strain: None    Food insecurity - worry: None    Food insecurity - inability: None    Transportation needs - medical: None    Transportation needs - non-medical: None   Occupational History    None   Tobacco Use    Smoking status: Former Smoker    Smokeless tobacco: Never Used   Substance and Sexual Activity     Alcohol use: No    Drug use: No    Sexual activity: Yes     Partners: Male   Other Topics Concern    None   Social History Narrative    None       Family History:   family history includes Arthritis in her father and mother; COPD in her father and mother; Heart disease in her father and mother; Hyperlipidemia in her mother; Hypertension in her father; Vision loss in her father.    Health Maintenance   Topic Date Due    TETANUS VACCINE  04/14/1985    Pneumococcal Vaccine (Medium Risk) (1 of 1 - PPSV23) 04/14/1986    Mammogram  04/14/2007    Colonoscopy  04/14/2017    Influenza Vaccine  08/01/2018    Lipid Panel  06/23/2020       Physical Exam:    Vital Signs  Temp: 96.9 °F (36.1 °C)  Temp src: Tympanic  Pulse: 82  SpO2: 98 %  BP: 122/86  BP Location: Left arm  Patient Position: Sitting  Pain Score:   6  Pain Loc: Arm  Height and Weight  Weight: 69.2 kg (152 lb 7.2 oz)]    Body mass index is 26.17 kg/m².    Physical Exam   Constitutional: She is oriented to person, place, and time. She appears well-developed.   HENT:   Mouth/Throat: Oropharynx is clear and moist.   Eyes: Conjunctivae are normal. Pupils are equal, round, and reactive to light.   Neck: Normal range of motion. Neck supple.   Cardiovascular: Normal rate, regular rhythm and normal heart sounds.   No murmur heard.  Pulmonary/Chest: Effort normal and breath sounds normal. No respiratory distress. She has no wheezes. She has no rales. She exhibits no tenderness.   Abdominal: Soft. She exhibits no distension and no mass. There is no tenderness. There is no guarding.   Musculoskeletal: She exhibits no edema.        Left shoulder: She exhibits tenderness.        Arms:  Tenderness to palpation of the rotator cuff.   Lymphadenopathy:     She has no cervical adenopathy.   Neurological: She is alert and oriented to person, place, and time. She has normal reflexes.   Skin: Skin is warm and dry.   Psychiatric: She has a normal mood and affect. Her behavior  is normal. Judgment and thought content normal.         Assessment:      ICD-10-CM ICD-9-CM   1. Chest pain, unspecified type R07.9 786.50   2. Acute left-sided thoracic back pain M54.6 724.1         Plan:    Patient's EKG was negative explained to her that we cannot rule out non STEMI.  Explained to her that she needs to be observed in hospital for the next several hours with serial EKGs and cardiac enzymes she should also have a stress test.  Aspirin was given 325 mg p.o. x1.  Patient headed to [facility] ED.  She also has musculoskeletal type of pain in affecting the back and the shoulder muscles.      Orders Placed This Encounter   Procedures    EKG 12-lead       No current facility-administered medications for this visit.      No current outpatient medications on file.       Medications Discontinued During This Encounter   Medication Reason    diclofenac sodium (VOLTAREN) 1 % Gel Patient no longer taking    oxybutynin (DITROPAN) 5 MG Tab Patient no longer taking    topiramate (TOPAMAX) 50 MG tablet Patient no longer taking       No Follow-up on file.      Carmen Shea MD

## 2018-12-10 NOTE — ED NOTES
Pt lying in bed in NAD,VSS,RR equal and unlabored. Bed is low, locked, and call light in reach. Side rails up x 2.

## 2018-12-10 NOTE — ED NOTES
Pt lying in bed in NAD,VSS,RR equal and unlabored. Bed is low, locked, and call light in reach. Side rails up x 2. Pt updated on POC.

## 2018-12-10 NOTE — ED PROVIDER NOTES
SCRIBE #1 NOTE: I, Mary Damon, am scribing for, and in the presence of, Sahil Garcia MD. I have scribed the entire note.      History      Chief Complaint   Patient presents with    Chest Pain     sent from PCP midsternal pain, radiates into left arm and pain felt in between shoulder blades       Review of patient's allergies indicates:   Allergen Reactions    Codeine Hives and Nausea Only        HPI   HPI    12/10/2018, 11:31 AM   History obtained from the patient      History of Present Illness: Kamilla Leavitt is a 53 y.o. female patient with a PMHx of Crohn's disease who presents to the Emergency Department for midsternal CP which onset gradually a couple days ago. Pt states the pain began radiating to L arm and in between shoulder blades today. Pt was evaluated by Dr. Shea (Family Medicine) PTA and was referred to ED for further evaluation. Pt had an EKG performed and was given 325 mg in clinic. Symptoms are intermittent and moderate in severity. No mitigating or exacerbating factors reported. No associated sxs reported. Patient denies any SOB, diaphoresis, palpitations, extremity weakness/numbness, leg pain/swelling, dizziness, cough, n/v, fever chills, and all other sxs at this time. No further complaints or concerns at this time.         Arrival mode: Personal vehicle      PCP: Carmen Shea MD       Past Medical History:  History reviewed. No pertinent past medical history.    Past Surgical History:  Past Surgical History:   Procedure Laterality Date    CHOLECYSTECTOMY      HYSTERECTOMY      TONSILLECTOMY           Family History:  Family History   Problem Relation Age of Onset    Arthritis Mother     COPD Mother     Heart disease Mother     Hyperlipidemia Mother     Arthritis Father     COPD Father     Heart disease Father     Hypertension Father     Vision loss Father     Cancer Neg Hx        Social History:  Social History     Tobacco Use    Smoking status: Former Smoker     Smokeless tobacco: Never Used   Substance and Sexual Activity    Alcohol use: No    Drug use: No    Sexual activity: Yes     Partners: Male       ROS   Review of Systems   Constitutional: Negative for activity change, appetite change, chills, diaphoresis and fever.   HENT: Negative for congestion, drooling, ear pain, mouth sores, rhinorrhea, sinus pain, sore throat and trouble swallowing.    Eyes: Negative for pain and discharge.   Respiratory: Negative for cough, chest tightness, shortness of breath, wheezing and stridor.    Cardiovascular: Positive for chest pain. Negative for palpitations and leg swelling.   Gastrointestinal: Negative for abdominal distention, abdominal pain, blood in stool, constipation, diarrhea, nausea and vomiting.   Genitourinary: Negative for difficulty urinating, dysuria, flank pain, frequency, hematuria and urgency.   Musculoskeletal: Negative for arthralgias, back pain and myalgias.   Skin: Negative for pallor, rash and wound.   Neurological: Negative for dizziness, syncope, weakness, light-headedness and numbness.   Hematological: Does not bruise/bleed easily.   All other systems reviewed and are negative.      Physical Exam      Initial Vitals [12/10/18 1123]   BP Pulse Resp Temp SpO2   (!) 156/78 73 18 97.4 °F (36.3 °C) 98 %      MAP       --          Physical Exam  Nursing Notes and Vital Signs Reviewed.  Constitutional: Patient is in no acute distress. Well-developed and well-nourished.  Head: Atraumatic. Normocephalic.  Eyes: PERRL. EOM intact. Conjunctivae are not pale. No scleral icterus.  ENT: Mucous membranes are moist. Oropharynx is clear and symmetric.    Neck: Supple. Full ROM. No lymphadenopathy.  Cardiovascular: Regular rate. Regular rhythm. No murmurs, rubs, or gallops. Distal pulses are 2+ and symmetric.  Pulmonary/Chest: No respiratory distress. Clear to auscultation bilaterally. No wheezing or rales.  Abdominal: Soft and non-distended.  There is no tenderness.   "No rebound, guarding, or rigidity.   Musculoskeletal: Moves all extremities. No obvious deformities. No edema. No calf tenderness.  Skin: Warm and dry.  Neurological:  Alert, awake, and appropriate.  Normal speech.  No acute focal neurological deficits are appreciated.  Psychiatric: Normal affect. Good eye contact. Appropriate in content.    ED Course    Procedures  ED Vital Signs:  Vitals:    12/10/18 1123 12/10/18 1152 12/10/18 1229 12/10/18 1244   BP: (!) 156/78 135/72 128/69 137/63   Pulse: 73 72 69 68   Resp: 18  14 20   Temp: 97.4 °F (36.3 °C)      TempSrc: Oral      SpO2: 98% 99% 97% 98%   Weight: 69.3 kg (152 lb 10.7 oz)      Height: 5' 4" (1.626 m)          Abnormal Lab Results:  Labs Reviewed   URINALYSIS, REFLEX TO URINE CULTURE - Abnormal; Notable for the following components:       Result Value    Ketones, UA Trace (*)     Occult Blood UA Trace (*)     All other components within normal limits    Narrative:     Preferred Collection Type->Urine, Clean Catch   CBC W/ AUTO DIFFERENTIAL   COMPREHENSIVE METABOLIC PANEL   B-TYPE NATRIURETIC PEPTIDE   CK   TROPONIN I        All Lab Results:    Results for orders placed or performed during the hospital encounter of 12/10/18   CBC auto differential   Result Value Ref Range    WBC 5.59 3.90 - 12.70 K/uL    RBC 4.75 4.00 - 5.40 M/uL    Hemoglobin 13.3 12.0 - 16.0 g/dL    Hematocrit 40.8 37.0 - 48.5 %    MCV 86 82 - 98 fL    MCH 28.0 27.0 - 31.0 pg    MCHC 32.6 32.0 - 36.0 g/dL    RDW 12.8 11.5 - 14.5 %    Platelets 264 150 - 350 K/uL    MPV 10.1 9.2 - 12.9 fL    Gran # (ANC) 3.4 1.8 - 7.7 K/uL    Lymph # 1.7 1.0 - 4.8 K/uL    Mono # 0.4 0.3 - 1.0 K/uL    Eos # 0.1 0.0 - 0.5 K/uL    Baso # 0.01 0.00 - 0.20 K/uL    Gran% 61.4 38.0 - 73.0 %    Lymph% 29.5 18.0 - 48.0 %    Mono% 6.8 4.0 - 15.0 %    Eosinophil% 2.1 0.0 - 8.0 %    Basophil% 0.2 0.0 - 1.9 %    Differential Method Automated    Comprehensive metabolic panel   Result Value Ref Range    Sodium 141 136 - 145 " mmol/L    Potassium 3.8 3.5 - 5.1 mmol/L    Chloride 104 95 - 110 mmol/L    CO2 27 23 - 29 mmol/L    Glucose 96 70 - 110 mg/dL    BUN, Bld 13 6 - 20 mg/dL    Creatinine 0.7 0.5 - 1.4 mg/dL    Calcium 10.0 8.7 - 10.5 mg/dL    Total Protein 7.3 6.0 - 8.4 g/dL    Albumin 4.1 3.5 - 5.2 g/dL    Total Bilirubin 0.4 0.1 - 1.0 mg/dL    Alkaline Phosphatase 66 55 - 135 U/L    AST 16 10 - 40 U/L    ALT 16 10 - 44 U/L    Anion Gap 10 8 - 16 mmol/L    eGFR if African American >60 >60 mL/min/1.73 m^2    eGFR if non African American >60 >60 mL/min/1.73 m^2   Urinalysis, Reflex to Urine Culture Urine, Clean Catch   Result Value Ref Range    Specimen UA Urine, Clean Catch     Color, UA Yellow Yellow, Straw, [place]    Appearance, UA Clear Clear    pH, UA 6.0 5.0 - 8.0    Specific Gravity, UA 1.030 1.005 - 1.030    Protein, UA Negative Negative    Glucose, UA Negative Negative    Ketones, UA Trace (A) Negative    Bilirubin (UA) Negative Negative    Occult Blood UA Trace (A) Negative    Nitrite, UA Negative Negative    Urobilinogen, UA Negative <2.0 EU/dL    Leukocytes, UA Negative Negative   Brain natriuretic peptide   Result Value Ref Range    BNP 17 0 - 99 pg/mL   CK   Result Value Ref Range    CPK 47 20 - 180 U/L   Troponin I   Result Value Ref Range    Troponin I <0.006 0.000 - 0.026 ng/mL       Imaging Results:  Imaging Results          X-Ray Chest PA And Lateral (Final result)  Result time 12/10/18 12:28:01    Final result by JILLIAN Delcid Sr., MD (12/10/18 12:28:01)                 Impression:      Normal study.      Electronically signed by: Marcus Delcid MD  Date:    12/10/2018  Time:    12:28             Narrative:    EXAMINATION:  XR CHEST PA AND LATERAL    CLINICAL HISTORY:  chest pain;    COMPARISON:  01/10/2018    FINDINGS:  The size and contour of the heart are normal. The lungs are clear. There is no pneumothorax or pleural effusion.                               The EKG was ordered, reviewed, and independently  interpreted by the ED provider.  Interpretation time: 1129  Rate: 71 BPM  Rhythm: normal sinus rhythm  Interpretation: Cannot rule out anterior infarct. No STEMI.             The Emergency Provider reviewed the vital signs and test results, which are outlined above.    ED Discussion     12:37 PM: Reassessed pt at this time.  Pt is awake, alert, and in NAD at this time. Discussed with pt all pertinent ED information and results. Discussed pt dx and plan of tx. Gave pt all f/u and return to the ED instructions. All questions and concerns were addressed at this time. Pt expresses understanding of information and instructions, and is comfortable with plan to discharge. Pt is stable for discharge.    I have discussed with patient and/or family/caretaker chest pain precautions, specifically to return for worsening chest pain, shortness of breath, fever, or any concern.  I have low suspicion for cardiopulmonary, vascular, infectious, respiratory, or other emergent medical condition based on my evaluation in the ED.    I discussed with patient and/or family/caretaker that evaluation in the ED does not suggest any emergent or life threatening medical conditions requiring immediate intervention beyond what was provided in the ED, and I believe patient is safe for discharge.  Regardless, an unremarkable evaluation in the ED does not preclude the development or presence of a serious of life threatening condition. As such, patient was instructed to return immediately for any worsening or change in current symptoms.    Discharge Medication List as of 12/10/2018 12:34 PM      START taking these medications    Details   naproxen (NAPROSYN) 375 MG tablet Take 1 tablet (375 mg total) by mouth 2 (two) times daily with meals., Starting Mon 12/10/2018, Normal         CONTINUE these medications which have NOT CHANGED    Details   butalbital-acetaminophen-caffeine -40 mg (FIORICET, ESGIC) -40 mg per tablet Take 1 tablet by  mouth every 4 (four) hours as needed for Pain., Historical Med      topiramate (TOPAMAX) 100 MG tablet Take 100 mg by mouth 2 (two) times daily., Historical Med               ED Medication(s):  Medications - No data to display    Follow-up Information     Carmen Shea MD.    Specialty:  Family Medicine  Contact information:  [address] [address] [id number] [phone]                     Medical Decision Making    Medical Decision Making:   Clinical Tests:   Lab Tests: Ordered and Reviewed  Radiological Study: Ordered and Reviewed  Medical Tests: Ordered and Reviewed           Scribe Attestation:   Scribe #1: I performed the above scribed service and the documentation accurately describes the services I performed. I attest to the accuracy of the note.    Attending:   Physician Attestation Statement for Scribe #1: I, Sahil Garcia MD, personally performed the services described in this documentation, as scribed by Mary Damon, in my presence, and it is both accurate and complete.          Clinical Impression       ICD-10-CM ICD-9-CM   1. Chest pain R07.9 786.50       Disposition:   Disposition: Discharged  Condition: Stable         Sahil Garcia MD  12/10/18 0073

## 2018-12-11 ENCOUNTER — PATIENT MESSAGE (OUTPATIENT)
Dept: FAMILY MEDICINE | Facility: CLINIC | Age: 53
End: 2018-12-11

## 2018-12-11 DIAGNOSIS — R07.9 ACUTE CHEST PAIN: ICD-10-CM

## 2018-12-13 ENCOUNTER — PATIENT MESSAGE (OUTPATIENT)
Dept: FAMILY MEDICINE | Facility: CLINIC | Age: 53
End: 2018-12-13

## 2019-02-01 ENCOUNTER — OFFICE VISIT (OUTPATIENT)
Dept: FAMILY MEDICINE | Facility: CLINIC | Age: 54
End: 2019-02-01
Payer: COMMERCIAL

## 2019-02-01 VITALS
HEIGHT: 63 IN | BODY MASS INDEX: 26.99 KG/M2 | OXYGEN SATURATION: 98 % | TEMPERATURE: 98 F | DIASTOLIC BLOOD PRESSURE: 98 MMHG | WEIGHT: 152.31 LBS | HEART RATE: 77 BPM | SYSTOLIC BLOOD PRESSURE: 150 MMHG

## 2019-02-01 DIAGNOSIS — B96.89 SINUSITIS, BACTERIAL: Primary | ICD-10-CM

## 2019-02-01 DIAGNOSIS — J32.9 SINUSITIS, BACTERIAL: Primary | ICD-10-CM

## 2019-02-01 DIAGNOSIS — R03.0 ELEVATED BLOOD PRESSURE READING WITHOUT DIAGNOSIS OF HYPERTENSION: ICD-10-CM

## 2019-02-01 PROCEDURE — 3008F BODY MASS INDEX DOCD: CPT | Mod: CPTII,S$GLB,, | Performed by: FAMILY MEDICINE

## 2019-02-01 PROCEDURE — 99999 PR PBB SHADOW E&M-EST. PATIENT-LVL IV: ICD-10-PCS | Mod: PBBFAC,,, | Performed by: FAMILY MEDICINE

## 2019-02-01 PROCEDURE — 3008F PR BODY MASS INDEX (BMI) DOCUMENTED: ICD-10-PCS | Mod: CPTII,S$GLB,, | Performed by: FAMILY MEDICINE

## 2019-02-01 PROCEDURE — 99214 PR OFFICE/OUTPT VISIT, EST, LEVL IV, 30-39 MIN: ICD-10-PCS | Mod: 25,S$GLB,, | Performed by: FAMILY MEDICINE

## 2019-02-01 PROCEDURE — 96372 THER/PROPH/DIAG INJ SC/IM: CPT | Mod: S$GLB,,, | Performed by: FAMILY MEDICINE

## 2019-02-01 PROCEDURE — 96372 PR INJECTION,THERAP/PROPH/DIAG2ST, IM OR SUBCUT: ICD-10-PCS | Mod: S$GLB,,, | Performed by: FAMILY MEDICINE

## 2019-02-01 PROCEDURE — 99214 OFFICE O/P EST MOD 30 MIN: CPT | Mod: 25,S$GLB,, | Performed by: FAMILY MEDICINE

## 2019-02-01 PROCEDURE — 99999 PR PBB SHADOW E&M-EST. PATIENT-LVL IV: CPT | Mod: PBBFAC,,, | Performed by: FAMILY MEDICINE

## 2019-02-01 RX ORDER — LORATADINE 10 MG/1
10 TABLET ORAL DAILY
Refills: 0 | COMMUNITY
Start: 2019-02-01 | End: 2019-02-12

## 2019-02-01 RX ORDER — BETAMETHASONE SODIUM PHOSPHATE AND BETAMETHASONE ACETATE 3; 3 MG/ML; MG/ML
9 INJECTION, SUSPENSION INTRA-ARTICULAR; INTRALESIONAL; INTRAMUSCULAR; SOFT TISSUE
Status: COMPLETED | OUTPATIENT
Start: 2019-02-01 | End: 2019-02-01

## 2019-02-01 RX ORDER — AMOXICILLIN AND CLAVULANATE POTASSIUM 875; 125 MG/1; MG/1
1 TABLET, FILM COATED ORAL 2 TIMES DAILY
Qty: 20 TABLET | Refills: 0 | Status: SHIPPED | OUTPATIENT
Start: 2019-02-01 | End: 2019-02-12

## 2019-02-01 RX ADMIN — BETAMETHASONE SODIUM PHOSPHATE AND BETAMETHASONE ACETATE 9 MG: 3; 3 INJECTION, SUSPENSION INTRA-ARTICULAR; INTRALESIONAL; INTRAMUSCULAR; SOFT TISSUE at 09:02

## 2019-02-01 NOTE — PATIENT INSTRUCTIONS
Sinusitis (Antibiotic Treatment)    The sinuses are air-filled spaces within the bones of the face. They connect to the inside of the nose. Sinusitis is an inflammation of the tissue lining the sinus cavity. Sinus inflammation can occur during a cold. It can also be due to allergies to pollens and other particles in the air. Sinusitis can cause symptoms of sinus congestion and fullness. A sinus infection causes fever, headache and facial pain. There is often green or yellow drainage from the nose or into the back of the throat (post-nasal drip). You have been given antibiotics to treat this condition.  Home care:  · Take the full course of antibiotics as instructed. Do not stop taking them, even if you feel better.  · Drink plenty of water, hot tea, and other liquids. This may help thin mucus. It also may promote sinus drainage.  · Heat may help soothe painful areas of the face. Use a towel soaked in hot water. Or,  the shower and direct the hot spray onto your face. Using a vaporizer along with a menthol rub at night may also help.   · An expectorant containing guaifenesin may help thin the mucus and promote drainage from the sinuses.  · Over-the-counter decongestants may be used unless a similar medicine was prescribed. Nasal sprays work the fastest. Use one that contains phenylephrine or oxymetazoline. First blow the nose gently. Then use the spray. Do not use these medicines more often than directed on the label or symptoms may get worse. You may also use tablets containing pseudoephedrine. Avoid products that combine ingredients, because side effects may be increased. Read labels. You can also ask the pharmacist for help. (NOTE: Persons with high blood pressure should not use decongestants. They can raise blood pressure.)  · Over-the-counter antihistamines may help if allergies contributed to your sinusitis.    · Do not use nasal rinses or irrigation during an acute sinus infection, unless told to by  your health care provider. Rinsing may spread the infection to other sinuses.  · Use acetaminophen or ibuprofen to control pain, unless another pain medicine was prescribed. (If you have chronic liver or kidney disease or ever had a stomach ulcer, talk with your doctor before using these medicines. Aspirin should never be used in anyone under 18 years of age who is ill with a fever. It may cause severe liver damage.)  · Don't smoke. This can worsen symptoms.  Follow-up care  Follow up with your healthcare provider or our staff if you are not improving within the next week.  When to seek medical advice  Call your healthcare provider if any of these occur:  · Facial pain or headache becoming more severe  · Stiff neck  · Unusual drowsiness or confusion  · Swelling of the forehead or eyelids  · Vision problems, including blurred or double vision  · Fever of 100.4ºF (38ºC) or higher, or as directed by your healthcare provider  · Seizure  · Breathing problems  · Symptoms not resolving within 10 days  Date Last Reviewed: 4/13/2015  © 8916-1629 [employer]. [address] [place] [address], [place] [address]. All rights reserved. This information is not intended as a substitute for professional medical care. Always follow your healthcare professional's instructions.

## 2019-02-01 NOTE — PROGRESS NOTES
Admin betamethasone 9 mg im to rv. Tolerated well. Recommended to wait for 15 mins for adverse reactions. Left in stable condition.

## 2019-02-12 ENCOUNTER — HOSPITAL ENCOUNTER (OUTPATIENT)
Dept: RADIOLOGY | Facility: HOSPITAL | Age: 54
Discharge: HOME | End: 2019-02-12
Attending: FAMILY MEDICINE
Payer: COMMERCIAL

## 2019-02-12 ENCOUNTER — PATIENT MESSAGE (OUTPATIENT)
Dept: FAMILY MEDICINE | Facility: CLINIC | Age: 54
End: 2019-02-12

## 2019-02-12 ENCOUNTER — OFFICE VISIT (OUTPATIENT)
Dept: FAMILY MEDICINE | Facility: CLINIC | Age: 54
End: 2019-02-12
Payer: COMMERCIAL

## 2019-02-12 VITALS
HEART RATE: 77 BPM | DIASTOLIC BLOOD PRESSURE: 76 MMHG | OXYGEN SATURATION: 98 % | HEIGHT: 63 IN | SYSTOLIC BLOOD PRESSURE: 118 MMHG | BODY MASS INDEX: 26.91 KG/M2 | TEMPERATURE: 98 F | WEIGHT: 151.88 LBS

## 2019-02-12 DIAGNOSIS — G43.109 OCULAR MIGRAINE: Primary | ICD-10-CM

## 2019-02-12 DIAGNOSIS — G43.109 OCULAR MIGRAINE: ICD-10-CM

## 2019-02-12 DIAGNOSIS — J32.9 BACTERIAL SINUSITIS: ICD-10-CM

## 2019-02-12 DIAGNOSIS — B96.89 BACTERIAL SINUSITIS: ICD-10-CM

## 2019-02-12 PROCEDURE — 99999 PR PBB SHADOW E&M-EST. PATIENT-LVL IV: CPT | Mod: PBBFAC,,, | Performed by: FAMILY MEDICINE

## 2019-02-12 PROCEDURE — 99999 PR PBB SHADOW E&M-EST. PATIENT-LVL IV: ICD-10-PCS | Mod: PBBFAC,,, | Performed by: FAMILY MEDICINE

## 2019-02-12 PROCEDURE — 70450 CT HEAD/BRAIN W/O DYE: CPT | Mod: TC

## 2019-02-12 PROCEDURE — 99214 OFFICE O/P EST MOD 30 MIN: CPT | Mod: S$GLB,,, | Performed by: FAMILY MEDICINE

## 2019-02-12 PROCEDURE — 3008F PR BODY MASS INDEX (BMI) DOCUMENTED: ICD-10-PCS | Mod: CPTII,S$GLB,, | Performed by: FAMILY MEDICINE

## 2019-02-12 PROCEDURE — 3008F BODY MASS INDEX DOCD: CPT | Mod: CPTII,S$GLB,, | Performed by: FAMILY MEDICINE

## 2019-02-12 PROCEDURE — 99214 PR OFFICE/OUTPT VISIT, EST, LEVL IV, 30-39 MIN: ICD-10-PCS | Mod: S$GLB,,, | Performed by: FAMILY MEDICINE

## 2019-02-12 RX ORDER — DIAZEPAM 5 MG/1
5 TABLET ORAL EVERY 12 HOURS PRN
Qty: 4 TABLET | Refills: 0 | Status: SHIPPED | OUTPATIENT
Start: 2019-02-12 | End: 2019-02-18

## 2019-02-12 RX ORDER — ONDANSETRON 4 MG/1
4 TABLET, FILM COATED ORAL EVERY 8 HOURS PRN
Qty: 30 TABLET | Refills: 0 | Status: SHIPPED | OUTPATIENT
Start: 2019-02-12 | End: 2019-03-15

## 2019-02-12 RX ORDER — BUTALBITAL, ACETAMINOPHEN AND CAFFEINE 50; 325; 40 MG/1; MG/1; MG/1
1 TABLET ORAL EVERY 6 HOURS PRN
Qty: 20 TABLET | Refills: 0 | Status: SHIPPED | OUTPATIENT
Start: 2019-02-12 | End: 2019-02-18

## 2019-02-12 NOTE — PATIENT INSTRUCTIONS
Preventing Migraine Headaches: Medicines and Lifestyle Changes     Going to bed and getting up at the same time each day, including weekends, may help prevent migraines.     A migraine is a type of severe headache. Having a migraine can be very painful. But there are steps you can take to help prevent migraines.  Medicines to help prevent migraines  · Your healthcare provider may prescribe certain medicines to help prevent migraines. These medicines may need to be taken daily. Or they may only need to be taken at times when youre likely to have a migraine.  · Common medicines used to help prevent migraines include:  ¨ Triptans (serotonin receptor agonists)  ¨ Nonsteroidal anti-inflammatory drugs (available over-the-counter)  ¨ Beta-blockers  ¨ Anticonvulsants  ¨ Tricyclic antidepressants  ¨ Calcium channel blockers  ¨ Certain vitamins, minerals, and plant extracts  ¨ Botulinum toxin injection (Botox) for certain chronic migraines   ¨ CGRP (calcitonin gene-related peptide) agnonists are being reviewed by the Food and Drug Administration (FDA)  Lifestyle changes for long-term prevention  Here are some suggestions:  · Exercise. Regular exercise can help prevent migraines and improve your health. (If exercise triggers your migraines, talk to your healthcare provider.)  · Keep regular habits. Dont skip or delay meals. Drink plenty of water. And go to bed and get up at about the same time each day. This includes weekends.  · Try alternative treatments. These are treatments that do not involve the use of medicines or surgery. They may help relieve symptoms and prevent migraines. Some treatment options include biofeedback and acupuncture. Ask your healthcare provider to tell you more about these treatments if you have questions.  · Limit caffeine. You may find that caffeine helps relieve pain during an attack. But too much caffeine can also trigger migraines. So, limit the amount of caffeine you consume.  Date Last  Reviewed: 10/11/2015  © 6612-8459 [employer] [place]. [address] [place] [address], [place] [address]. All rights reserved. This information is not intended as a substitute for professional medical care. Always follow your healthcare professional's instructions.

## 2019-02-12 NOTE — PROGRESS NOTES
"Subjective:       Patient ID: Kamilla Leavitt is a 53 y.o. female.    Chief Complaint: Nausea; Facial Pain (Rt eye pressure); and Headache      HPI     Facial Pain      Additional comments: Rt eye pressure          Last edited by Kimberly Musa MA on 2/12/2019 10:55 AM. (History)     Right eye pressure x 2 days. New problem. Reports that "it hurt to close, the whole right side of the face feels like it is being pulled out". Pain extends to the occipital area if she tries to lay back. Worsening.   She had nausea and vomiting this morning.   Denies photophobia and phonophobia. No nasal congestion and no vision loss.  She has a history of migraine but never had any eye sxs or pain to the eye with the migraine.  Also, she has allergies, periorbital eye area stays swollen-she is not any med    I saw her in the clinic for bacterial sinusitis a little over a week ago but she reported that she cannot take any med for more than 2 days so as not flare up colitis due to Crohn's disease.    She does have crohn's disease but have not had a flare up in more than 5 years.      Past Medical History:   Diagnosis Date    Crohn's colitis      Family History   Problem Relation Age of Onset    Arthritis Mother     COPD Mother     Heart disease Mother     Hyperlipidemia Mother     Arthritis Father     COPD Father     Heart disease Father     Hypertension Father     Vision loss Father     Cancer Neg Hx      Social History     Socioeconomic History    Marital status:      Spouse name: Not on file    Number of children: Not on file    Years of education: Not on file    Highest education level: Not on file   Social Needs    Financial resource strain: Not on file    Food insecurity - worry: Not on file    Food insecurity - inability: Not on file    Transportation needs - medical: Not on file    Transportation needs - non-medical: Not on file   Occupational History    Not on file   Tobacco Use    Smoking status: " Former Smoker    Smokeless tobacco: Never Used   Substance and Sexual Activity    Alcohol use: No    Drug use: No    Sexual activity: Yes     Partners: Male   Other Topics Concern    Not on file   Social History Narrative    Not on file     Outpatient Encounter Medications as of 2/12/2019   Medication Sig Dispense Refill    butalbital-acetaminophen-caffeine -40 mg (FIORICET, ESGIC) -40 mg per tablet Take 1 tablet by mouth every 6 (six) hours as needed for Pain. 20 tablet 0    diazePAM (VALIUM) 5 MG tablet Take 1 tablet (5 mg total) by mouth every 12 (twelve) hours as needed for Anxiety. 4 tablet 0    ondansetron (ZOFRAN) 4 MG tablet Take 1 tablet (4 mg total) by mouth every 8 (eight) hours as needed for Nausea. 30 tablet 0    [DISCONTINUED] amoxicillin-clavulanate 875-125mg (AUGMENTIN) 875-125 mg per tablet Take 1 tablet by mouth 2 (two) times daily. for 10 days 20 tablet 0    [DISCONTINUED] butalbital-acetaminophen-caffeine -40 mg (FIORICET, ESGIC) -40 mg per tablet Take 1 tablet by mouth every 4 (four) hours as needed for Pain.      [DISCONTINUED] loratadine (CLARITIN) 10 mg tablet Take 1 tablet (10 mg total) by mouth once daily.  0     No facility-administered encounter medications on file as of 2/12/2019.        Review of Systems   Constitutional: Negative for chills and fever.   HENT: Positive for sinus pain. Negative for congestion and facial swelling.    Eyes: Negative for discharge and itching.   Respiratory: Negative for cough and wheezing.    Cardiovascular: Negative for chest pain and palpitations.   Gastrointestinal: Negative for abdominal pain, nausea and vomiting.   Endocrine: Negative for cold intolerance and heat intolerance.   Genitourinary: Negative for dysuria and flank pain.   Musculoskeletal: Negative for myalgias and neck stiffness.   Skin: Negative for pallor and wound.   Neurological: Negative for facial asymmetry and weakness.   Psychiatric/Behavioral:  "Negative for agitation and suicidal ideas.       Objective:      /76 (BP Location: Right arm, Patient Position: Sitting, BP Method: Medium (Manual))   Pulse 77   Temp 97.8 °F (36.6 °C) (Oral)   Ht 5' 3" (1.6 m)   Wt 68.9 kg (151 lb 14.4 oz)   SpO2 98%   BMI 26.91 kg/m²   Physical Exam   Constitutional: She is oriented to person, place, and time. She appears well-developed. No distress.   HENT:   Head: Normocephalic and atraumatic.   Right Ear: External ear normal.   Left Ear: External ear normal.   Nose: Rhinorrhea present. No mucosal edema. Right sinus exhibits maxillary sinus tenderness and frontal sinus tenderness.   Mouth/Throat: No posterior oropharyngeal edema or posterior oropharyngeal erythema.   Eyes: Conjunctivae and EOM are normal. Right eye exhibits no chemosis and no discharge. Left eye exhibits no chemosis and no discharge. Right conjunctiva is not injected. Left conjunctiva is not injected.   Right pupil reaction is sluggish.  Periorbital edema bilaterally  No visual loss   Neck: Neck supple. No thyromegaly present.   Cardiovascular: Normal rate and regular rhythm.   Pulmonary/Chest: Effort normal. No respiratory distress.   Abdominal: Soft. She exhibits no distension.   Genitourinary:   Genitourinary Comments: deferred   Musculoskeletal: She exhibits no edema or deformity.   Lymphadenopathy:        Head (right side): No submandibular adenopathy present.        Head (left side): No submandibular adenopathy present.     She has no cervical adenopathy.   Neurological: She is alert and oriented to person, place, and time.   Skin: Skin is warm and dry.   Psychiatric: She has a normal mood and affect. Her behavior is normal.   Nursing note and vitals reviewed.      Results for orders placed or performed during the hospital encounter of 12/10/18   CBC auto differential   Result Value Ref Range    WBC 5.59 3.90 - 12.70 K/uL    RBC 4.75 4.00 - 5.40 M/uL    Hemoglobin 13.3 12.0 - 16.0 g/dL    " Hematocrit 40.8 37.0 - 48.5 %    MCV 86 82 - 98 fL    MCH 28.0 27.0 - 31.0 pg    MCHC 32.6 32.0 - 36.0 g/dL    RDW 12.8 11.5 - 14.5 %    Platelets 264 150 - 350 K/uL    MPV 10.1 9.2 - 12.9 fL    Gran # (ANC) 3.4 1.8 - 7.7 K/uL    Lymph # 1.7 1.0 - 4.8 K/uL    Mono # 0.4 0.3 - 1.0 K/uL    Eos # 0.1 0.0 - 0.5 K/uL    Baso # 0.01 0.00 - 0.20 K/uL    Gran% 61.4 38.0 - 73.0 %    Lymph% 29.5 18.0 - 48.0 %    Mono% 6.8 4.0 - 15.0 %    Eosinophil% 2.1 0.0 - 8.0 %    Basophil% 0.2 0.0 - 1.9 %    Differential Method Automated    Comprehensive metabolic panel   Result Value Ref Range    Sodium 141 136 - 145 mmol/L    Potassium 3.8 3.5 - 5.1 mmol/L    Chloride 104 95 - 110 mmol/L    CO2 27 23 - 29 mmol/L    Glucose 96 70 - 110 mg/dL    BUN, Bld 13 6 - 20 mg/dL    Creatinine 0.7 0.5 - 1.4 mg/dL    Calcium 10.0 8.7 - 10.5 mg/dL    Total Protein 7.3 6.0 - 8.4 g/dL    Albumin 4.1 3.5 - 5.2 g/dL    Total Bilirubin 0.4 0.1 - 1.0 mg/dL    Alkaline Phosphatase 66 55 - 135 U/L    AST 16 10 - 40 U/L    ALT 16 10 - 44 U/L    Anion Gap 10 8 - 16 mmol/L    eGFR if African American >60 >60 mL/min/1.73 m^2    eGFR if non African American >60 >60 mL/min/1.73 m^2   Urinalysis, Reflex to Urine Culture Urine, Clean Catch   Result Value Ref Range    Specimen UA Urine, Clean Catch     Color, UA Yellow Yellow, Straw, Josefa    Appearance, UA Clear Clear    pH, UA 6.0 5.0 - 8.0    Specific Gravity, UA 1.030 1.005 - 1.030    Protein, UA Negative Negative    Glucose, UA Negative Negative    Ketones, UA Trace (A) Negative    Bilirubin (UA) Negative Negative    Occult Blood UA Trace (A) Negative    Nitrite, UA Negative Negative    Urobilinogen, UA Negative <2.0 EU/dL    Leukocytes, UA Negative Negative   Brain natriuretic peptide   Result Value Ref Range    BNP 17 0 - 99 pg/mL   CK   Result Value Ref Range    CPK 47 20 - 180 U/L   Troponin I   Result Value Ref Range    Troponin I <0.006 0.000 - 0.026 ng/mL     Assessment:       1. Ocular migraine    2.  Bacterial sinusitis        Plan:   Ocular migraine: undiagnosed new problem with uncertain prognosis  Suspect ocular migraine on sinusitis  Overlap with sinusitis but the eye finding on exam is concerning for an infarct.  -     CT Head Without Contrast; Future; Expected date: 02/12/2019  -     diazePAM (VALIUM) 5 MG tablet; Take 1 tablet (5 mg total) by mouth every 12 (twelve) hours as needed for Anxiety.  Dispense: 4 tablet; Refill: 0  -     butalbital-acetaminophen-caffeine -40 mg (FIORICET, ESGIC) -40 mg per tablet; Take 1 tablet by mouth every 6 (six) hours as needed for Pain.  Dispense: 20 tablet; Refill: 0    Bacterial sinusitis  -complete your abx- Augmentin.  Other orders  -     ondansetron (ZOFRAN) 4 MG tablet; Take 1 tablet (4 mg total) by mouth every 8 (eight) hours as needed for Nausea.  Dispense: 30 tablet; Refill: 0

## 2019-02-13 ENCOUNTER — PATIENT MESSAGE (OUTPATIENT)
Dept: FAMILY MEDICINE | Facility: CLINIC | Age: 54
End: 2019-02-13

## 2019-02-15 ENCOUNTER — PATIENT MESSAGE (OUTPATIENT)
Dept: FAMILY MEDICINE | Facility: CLINIC | Age: 54
End: 2019-02-15

## 2019-02-18 ENCOUNTER — OFFICE VISIT (OUTPATIENT)
Dept: FAMILY MEDICINE | Facility: CLINIC | Age: 54
End: 2019-02-18
Payer: COMMERCIAL

## 2019-02-18 VITALS
HEART RATE: 90 BPM | SYSTOLIC BLOOD PRESSURE: 118 MMHG | DIASTOLIC BLOOD PRESSURE: 80 MMHG | TEMPERATURE: 98 F | HEIGHT: 63 IN | OXYGEN SATURATION: 98 % | BODY MASS INDEX: 27.31 KG/M2 | WEIGHT: 154.13 LBS

## 2019-02-18 DIAGNOSIS — G43.811 OTHER MIGRAINE WITH STATUS MIGRAINOSUS, INTRACTABLE: Primary | ICD-10-CM

## 2019-02-18 PROCEDURE — 3008F BODY MASS INDEX DOCD: CPT | Mod: CPTII,S$GLB,, | Performed by: FAMILY MEDICINE

## 2019-02-18 PROCEDURE — 99999 PR PBB SHADOW E&M-EST. PATIENT-LVL III: ICD-10-PCS | Mod: PBBFAC,,, | Performed by: FAMILY MEDICINE

## 2019-02-18 PROCEDURE — 99213 OFFICE O/P EST LOW 20 MIN: CPT | Mod: S$GLB,,, | Performed by: FAMILY MEDICINE

## 2019-02-18 PROCEDURE — 3008F PR BODY MASS INDEX (BMI) DOCUMENTED: ICD-10-PCS | Mod: CPTII,S$GLB,, | Performed by: FAMILY MEDICINE

## 2019-02-18 PROCEDURE — 99213 PR OFFICE/OUTPT VISIT, EST, LEVL III, 20-29 MIN: ICD-10-PCS | Mod: S$GLB,,, | Performed by: FAMILY MEDICINE

## 2019-02-18 PROCEDURE — 99999 PR PBB SHADOW E&M-EST. PATIENT-LVL III: CPT | Mod: PBBFAC,,, | Performed by: FAMILY MEDICINE

## 2019-02-18 NOTE — PROGRESS NOTES
"Subjective:       Patient ID: Kamilla Leavitt is a 53 y.o. female.    Chief Complaint: Follow-up migraine.    Today she is feeling so much better and the pain is resolved with Fioricet prn. She does not need any refill.  I reviewed the CT of the brain and it shows chronic microvascular ischemia.Patient reports that she does not take any med for more than 2 days due to colitis.    From the previous note:   Right eye pressure x 2 days. New problem. Reports that "it hurt to close, the whole right side of the face feels like it is being pulled out". Pain extends to the occipital area if she tries to lay back. Worsening.   She had nausea and vomiting this morning.   Denies photophobia and phonophobia. No nasal congestion and no vision loss.  She has a history of migraine but never had any eye sxs or pain to the eye with the migraine.  Also, she has allergies, periorbital eye area stays swollen-she is not any med     I saw her in the clinic for bacterial sinusitis a little over a week ago but she reported that she cannot take any med for more than 2 days so as not flare up colitis due to Crohn's disease.    She does have crohn's disease but have not had a flare up in more than 5 years.     Past Medical History:   Diagnosis Date    Crohn's colitis      Family History   Problem Relation Age of Onset    Arthritis Mother     COPD Mother     Heart disease Mother     Hyperlipidemia Mother     Arthritis Father     COPD Father     Heart disease Father     Hypertension Father     Vision loss Father     Cancer Neg Hx      Social History     Socioeconomic History    Marital status:      Spouse name: Not on file    Number of children: Not on file    Years of education: Not on file    Highest education level: Not on file   Social Needs    Financial resource strain: Not on file    Food insecurity - worry: Not on file    Food insecurity - inability: Not on file    Transportation needs - medical: Not on file " "   Transportation needs - non-medical: Not on file   Occupational History    Not on file   Tobacco Use    Smoking status: Former Smoker    Smokeless tobacco: Never Used   Substance and Sexual Activity    Alcohol use: No    Drug use: No    Sexual activity: Yes     Partners: Male   Other Topics Concern    Not on file   Social History Narrative    Not on file     Outpatient Encounter Medications as of 2/18/2019   Medication Sig Dispense Refill    ondansetron (ZOFRAN) 4 MG tablet Take 1 tablet (4 mg total) by mouth every 8 (eight) hours as needed for Nausea. 30 tablet 0    [DISCONTINUED] butalbital-acetaminophen-caffeine -40 mg (FIORICET, ESGIC) -40 mg per tablet Take 1 tablet by mouth every 6 (six) hours as needed for Pain. 20 tablet 0    [DISCONTINUED] diazePAM (VALIUM) 5 MG tablet Take 1 tablet (5 mg total) by mouth every 12 (twelve) hours as needed for Anxiety. 4 tablet 0     No facility-administered encounter medications on file as of 2/18/2019.        Review of Systems   Constitutional: Negative for chills and fever.   HENT: Negative for congestion and facial swelling.    Eyes: Negative for discharge and itching.   Respiratory: Negative for cough and wheezing.    Cardiovascular: Negative for chest pain and palpitations.   Gastrointestinal: Negative for abdominal pain, nausea and vomiting.   Endocrine: Negative for cold intolerance and heat intolerance.   Genitourinary: Negative for dysuria and flank pain.   Musculoskeletal: Negative for myalgias and neck stiffness.   Skin: Negative for pallor and wound.   Neurological: Negative for facial asymmetry and weakness.   Psychiatric/Behavioral: Negative for agitation and suicidal ideas.       Objective:      /80 (BP Location: Right arm, Patient Position: Sitting, BP Method: Medium (Manual))   Pulse 90   Temp 98.2 °F (36.8 °C) (Oral)   Ht 5' 2.5" (1.588 m)   Wt 69.9 kg (154 lb 1.6 oz)   SpO2 98%   BMI 27.74 kg/m²   Physical Exam "   Constitutional: She is oriented to person, place, and time. She appears well-developed. No distress.   HENT:   Head: Normocephalic and atraumatic.   Right Ear: External ear normal.   Left Ear: External ear normal.   Eyes: Conjunctivae and EOM are normal. Right eye exhibits no chemosis, no discharge and no exudate. Right conjunctiva is not injected. Left conjunctiva is not injected.   Periorbital edema.     PEER and no longer sluggish on exam.     Neck: Neck supple. No thyromegaly present.   Cardiovascular: Normal rate and regular rhythm.   Pulmonary/Chest: Effort normal and breath sounds normal. No respiratory distress.   Abdominal: Soft. She exhibits no distension.   Genitourinary:   Genitourinary Comments: deferred   Musculoskeletal: She exhibits no edema or deformity.   Lymphadenopathy:        Head (right side): No submandibular adenopathy present.        Head (left side): No submandibular adenopathy present.     She has no cervical adenopathy.   Neurological: She is alert and oriented to person, place, and time.   Skin: Skin is warm and dry.   Psychiatric: She has a normal mood and affect. Her behavior is normal.   Nursing note and vitals reviewed.      Results for orders placed or performed during the hospital encounter of 12/10/18   CBC auto differential   Result Value Ref Range    WBC 5.59 3.90 - 12.70 K/uL    RBC 4.75 4.00 - 5.40 M/uL    Hemoglobin 13.3 12.0 - 16.0 g/dL    Hematocrit 40.8 37.0 - 48.5 %    MCV 86 82 - 98 fL    MCH 28.0 27.0 - 31.0 pg    MCHC 32.6 32.0 - 36.0 g/dL    RDW 12.8 11.5 - 14.5 %    Platelets 264 150 - 350 K/uL    MPV 10.1 9.2 - 12.9 fL    Gran # (ANC) 3.4 1.8 - 7.7 K/uL    Lymph # 1.7 1.0 - 4.8 K/uL    Mono # 0.4 0.3 - 1.0 K/uL    Eos # 0.1 0.0 - 0.5 K/uL    Baso # 0.01 0.00 - 0.20 K/uL    Gran% 61.4 38.0 - 73.0 %    Lymph% 29.5 18.0 - 48.0 %    Mono% 6.8 4.0 - 15.0 %    Eosinophil% 2.1 0.0 - 8.0 %    Basophil% 0.2 0.0 - 1.9 %    Differential Method Automated    Comprehensive  metabolic panel   Result Value Ref Range    Sodium 141 136 - 145 mmol/L    Potassium 3.8 3.5 - 5.1 mmol/L    Chloride 104 95 - 110 mmol/L    CO2 27 23 - 29 mmol/L    Glucose 96 70 - 110 mg/dL    BUN, Bld 13 6 - 20 mg/dL    Creatinine 0.7 0.5 - 1.4 mg/dL    Calcium 10.0 8.7 - 10.5 mg/dL    Total Protein 7.3 6.0 - 8.4 g/dL    Albumin 4.1 3.5 - 5.2 g/dL    Total Bilirubin 0.4 0.1 - 1.0 mg/dL    Alkaline Phosphatase 66 55 - 135 U/L    AST 16 10 - 40 U/L    ALT 16 10 - 44 U/L    Anion Gap 10 8 - 16 mmol/L    eGFR if African American >60 >60 mL/min/1.73 m^2    eGFR if non African American >60 >60 mL/min/1.73 m^2   Urinalysis, Reflex to Urine Culture Urine, Clean Catch   Result Value Ref Range    Specimen UA Urine, Clean Catch     Color, UA Yellow Yellow, Straw, Josefa    Appearance, UA Clear Clear    pH, UA 6.0 5.0 - 8.0    Specific Gravity, UA 1.030 1.005 - 1.030    Protein, UA Negative Negative    Glucose, UA Negative Negative    Ketones, UA Trace (A) Negative    Bilirubin (UA) Negative Negative    Occult Blood UA Trace (A) Negative    Nitrite, UA Negative Negative    Urobilinogen, UA Negative <2.0 EU/dL    Leukocytes, UA Negative Negative   Brain natriuretic peptide   Result Value Ref Range    BNP 17 0 - 99 pg/mL   CK   Result Value Ref Range    CPK 47 20 - 180 U/L   Troponin I   Result Value Ref Range    Troponin I <0.006 0.000 - 0.026 ng/mL     Intracranial compartment:    The brain parenchyma demonstrates areas of decreased attenuation with mild to moderate periventricular white matter consistent with chronic microvascular ischemic changes..  No parenchymal mass, hemorrhage, edema or major vascular distribution infarct.  Vascular calcifications are noted.    Mild prominence of the sulci and ventricles are consistent with age-related involutional changes.    No extra-axial blood or fluid collections.    Skull/extracranial contents (limited evaluation): No fracture. Mastoid air cells and paranasal sinuses are  essentially clear.      Impression       Chronic microvascular ischemic changes.       Assessment:       1. Other migraine with status migrainosus, intractable        Plan:   Intractable migraine with status migrainosus  Sxs resolved  Continue fioricet prn

## 2019-02-18 NOTE — PATIENT INSTRUCTIONS
Preventing Migraine Headaches: Medicines and Lifestyle Changes     Going to bed and getting up at the same time each day, including weekends, may help prevent migraines.     A migraine is a type of severe headache. Having a migraine can be very painful. But there are steps you can take to help prevent migraines.  Medicines to help prevent migraines  · Your healthcare provider may prescribe certain medicines to help prevent migraines. These medicines may need to be taken daily. Or they may only need to be taken at times when youre likely to have a migraine.  · Common medicines used to help prevent migraines include:  ¨ Triptans (serotonin receptor agonists)  ¨ Nonsteroidal anti-inflammatory drugs (available over-the-counter)  ¨ Beta-blockers  ¨ Anticonvulsants  ¨ Tricyclic antidepressants  ¨ Calcium channel blockers  ¨ Certain vitamins, minerals, and plant extracts  ¨ Botulinum toxin injection (Botox) for certain chronic migraines   ¨ CGRP (calcitonin gene-related peptide) agnonists are being reviewed by the Food and Drug Administration (FDA)  Lifestyle changes for long-term prevention  Here are some suggestions:  · Exercise. Regular exercise can help prevent migraines and improve your health. (If exercise triggers your migraines, talk to your healthcare provider.)  · Keep regular habits. Dont skip or delay meals. Drink plenty of water. And go to bed and get up at about the same time each day. This includes weekends.  · Try alternative treatments. These are treatments that do not involve the use of medicines or surgery. They may help relieve symptoms and prevent migraines. Some treatment options include biofeedback and acupuncture. Ask your healthcare provider to tell you more about these treatments if you have questions.  · Limit caffeine. You may find that caffeine helps relieve pain during an attack. But too much caffeine can also trigger migraines. So, limit the amount of caffeine you consume.  Date Last  Reviewed: 10/11/2015  © 9815-9255 [employer], [place]. [address] [place] [address], [place] [address]. All rights reserved. This information is not intended as a substitute for professional medical care. Always follow your healthcare professional's instructions.

## 2019-03-14 ENCOUNTER — PATIENT MESSAGE (OUTPATIENT)
Dept: FAMILY MEDICINE | Facility: CLINIC | Age: 54
End: 2019-03-14

## 2019-03-15 ENCOUNTER — TELEPHONE (OUTPATIENT)
Dept: FAMILY MEDICINE | Facility: CLINIC | Age: 54
End: 2019-03-15

## 2019-03-15 ENCOUNTER — PATIENT MESSAGE (OUTPATIENT)
Dept: FAMILY MEDICINE | Facility: CLINIC | Age: 54
End: 2019-03-15

## 2019-03-15 ENCOUNTER — OFFICE VISIT (OUTPATIENT)
Dept: FAMILY MEDICINE | Facility: CLINIC | Age: 54
End: 2019-03-15
Payer: COMMERCIAL

## 2019-03-15 VITALS
OXYGEN SATURATION: 97 % | HEIGHT: 63 IN | BODY MASS INDEX: 27.27 KG/M2 | DIASTOLIC BLOOD PRESSURE: 78 MMHG | SYSTOLIC BLOOD PRESSURE: 128 MMHG | WEIGHT: 153.88 LBS | TEMPERATURE: 99 F | HEART RATE: 84 BPM

## 2019-03-15 DIAGNOSIS — Z28.21 INFLUENZA VACCINATION DECLINED: ICD-10-CM

## 2019-03-15 DIAGNOSIS — J06.9 URI, ACUTE: Primary | ICD-10-CM

## 2019-03-15 DIAGNOSIS — Z12.39 BREAST CANCER SCREENING: ICD-10-CM

## 2019-03-15 DIAGNOSIS — Z23 NEED FOR TDAP VACCINATION: ICD-10-CM

## 2019-03-15 DIAGNOSIS — K50.919 CROHN'S DISEASE WITH COMPLICATION, UNSPECIFIED GASTROINTESTINAL TRACT LOCATION: ICD-10-CM

## 2019-03-15 DIAGNOSIS — J18.9 PNEUMONIA OF LOWER LOBE DUE TO INFECTIOUS ORGANISM, UNSPECIFIED LATERALITY: ICD-10-CM

## 2019-03-15 PROCEDURE — 3008F BODY MASS INDEX DOCD: CPT | Mod: CPTII,S$GLB,, | Performed by: FAMILY MEDICINE

## 2019-03-15 PROCEDURE — 96372 THER/PROPH/DIAG INJ SC/IM: CPT | Mod: S$GLB,,, | Performed by: FAMILY MEDICINE

## 2019-03-15 PROCEDURE — 99214 PR OFFICE/OUTPT VISIT, EST, LEVL IV, 30-39 MIN: ICD-10-PCS | Mod: 25,S$GLB,, | Performed by: FAMILY MEDICINE

## 2019-03-15 PROCEDURE — 99214 OFFICE O/P EST MOD 30 MIN: CPT | Mod: 25,S$GLB,, | Performed by: FAMILY MEDICINE

## 2019-03-15 PROCEDURE — 96372 PR INJECTION,THERAP/PROPH/DIAG2ST, IM OR SUBCUT: ICD-10-PCS | Mod: S$GLB,,, | Performed by: FAMILY MEDICINE

## 2019-03-15 PROCEDURE — 99999 PR PBB SHADOW E&M-EST. PATIENT-LVL III: CPT | Mod: PBBFAC,,, | Performed by: FAMILY MEDICINE

## 2019-03-15 PROCEDURE — 99999 PR PBB SHADOW E&M-EST. PATIENT-LVL III: ICD-10-PCS | Mod: PBBFAC,,, | Performed by: FAMILY MEDICINE

## 2019-03-15 PROCEDURE — 3008F PR BODY MASS INDEX (BMI) DOCUMENTED: ICD-10-PCS | Mod: CPTII,S$GLB,, | Performed by: FAMILY MEDICINE

## 2019-03-15 RX ORDER — OSELTAMIVIR PHOSPHATE 75 MG/1
CAPSULE ORAL
COMMUNITY
Start: 2019-03-10 | End: 2019-03-15

## 2019-03-15 RX ORDER — BETAMETHASONE SODIUM PHOSPHATE AND BETAMETHASONE ACETATE 3; 3 MG/ML; MG/ML
9 INJECTION, SUSPENSION INTRA-ARTICULAR; INTRALESIONAL; INTRAMUSCULAR; SOFT TISSUE
Status: COMPLETED | OUTPATIENT
Start: 2019-03-15 | End: 2019-03-15

## 2019-03-15 RX ADMIN — BETAMETHASONE SODIUM PHOSPHATE AND BETAMETHASONE ACETATE 9 MG: 3; 3 INJECTION, SUSPENSION INTRA-ARTICULAR; INTRALESIONAL; INTRAMUSCULAR; SOFT TISSUE at 10:03

## 2019-03-15 NOTE — PROGRESS NOTES
Subjective:       Patient ID: Kamilla Leavitt is a 53 y.o. female.    Chief Complaint: Influenza    She was diagnosed with flu (type unknown) via lab at [facility] 5 days ago and started on Tamiflu. She stopped taking it after 3 days because it flared up her crohns sxs.  She did good for the next 2 days but then her sxs came back with malaise, low grade fever-max temp of 100.1, rhinorrhea, body aches, cough and headache. She is eating and drinking.    She has crohn's disease with ch diarrhea and the frequency would increase during flare. The flare is subsiding.    She does not want the flu and PCV vaccine for life.  Will get the tetanus when the acute illness is over.  Past Medical History:   Diagnosis Date    Crohn's colitis      Family History   Problem Relation Age of Onset    Arthritis Mother     COPD Mother     Heart disease Mother     Hyperlipidemia Mother     Arthritis Father     COPD Father     Heart disease Father     Hypertension Father     Vision loss Father     Cancer Neg Hx      Social History     Socioeconomic History    Marital status:      Spouse name: Not on file    Number of children: Not on file    Years of education: Not on file    Highest education level: Not on file   Social Needs    Financial resource strain: Not on file    Food insecurity - worry: Not on file    Food insecurity - inability: Not on file    Transportation needs - medical: Not on file    Transportation needs - non-medical: Not on file   Occupational History    Not on file   Tobacco Use    Smoking status: Former Smoker    Smokeless tobacco: Never Used   Substance and Sexual Activity    Alcohol use: No    Drug use: No    Sexual activity: Yes     Partners: Male   Other Topics Concern    Not on file   Social History Narrative    Not on file     Outpatient Encounter Medications as of 3/15/2019   Medication Sig Dispense Refill    [DISCONTINUED] ondansetron (ZOFRAN) 4 MG tablet Take 1 tablet (4 mg total)  "by mouth every 8 (eight) hours as needed for Nausea. 30 tablet 0    [DISCONTINUED] oseltamivir (TAMIFLU) 75 MG capsule        Facility-Administered Encounter Medications as of 3/15/2019   Medication Dose Route Frequency Provider Last Rate Last Dose    [COMPLETED] betamethasone acetate-betamethasone sodium phosphate injection 9 mg  9 mg Intramuscular 1 time in Clinic/HOD Beth Anglin MD   9 mg at 03/15/19 1025       Review of Systems   Constitutional: Negative for chills and fever.   HENT: Negative for congestion and facial swelling.    Eyes: Negative for discharge and itching.   Respiratory: Negative for cough and wheezing.    Cardiovascular: Negative for chest pain and palpitations.   Gastrointestinal: Negative for abdominal pain, nausea and vomiting.   Endocrine: Negative for cold intolerance and heat intolerance.   Genitourinary: Negative for dysuria and flank pain.   Musculoskeletal: Negative for myalgias and neck stiffness.   Skin: Negative for pallor and wound.   Neurological: Negative for facial asymmetry and weakness.   Psychiatric/Behavioral: Negative for agitation and suicidal ideas.       Objective:      /78 (BP Location: Right arm, Patient Position: Sitting, BP Method: Medium (Manual))   Pulse 84   Temp 98.6 °F (37 °C) (Oral)   Ht 5' 3" (1.6 m)   Wt 69.8 kg (153 lb 14.1 oz)   SpO2 97%   BMI 27.26 kg/m²   Physical Exam   Constitutional: She is oriented to person, place, and time. She appears well-developed. No distress.   HENT:   Head: Normocephalic and atraumatic.       Right Ear: External ear normal.   Left Ear: External ear normal.   Nose: Right sinus exhibits frontal sinus tenderness. Left sinus exhibits frontal sinus tenderness.       Eyes: Conjunctivae and EOM are normal.   Neck: Neck supple. No thyromegaly present.   Cardiovascular: Normal rate and regular rhythm.   Pulmonary/Chest: Effort normal and breath sounds normal. No respiratory distress. She has no wheezes.   Abdominal: " Soft. She exhibits no distension.   Genitourinary:   Genitourinary Comments: deferred   Musculoskeletal: She exhibits no edema or deformity.   Lymphadenopathy:        Head (right side): No submandibular adenopathy present.        Head (left side): No submandibular adenopathy present.     She has no cervical adenopathy.   Neurological: She is alert and oriented to person, place, and time.   Skin: Skin is warm and dry.   Psychiatric: She has a normal mood and affect. Her behavior is normal.   Nursing note and vitals reviewed.      Results for orders placed or performed during the hospital encounter of 12/10/18   CBC auto differential   Result Value Ref Range    WBC 5.59 3.90 - 12.70 K/uL    RBC 4.75 4.00 - 5.40 M/uL    Hemoglobin 13.3 12.0 - 16.0 g/dL    Hematocrit 40.8 37.0 - 48.5 %    MCV 86 82 - 98 fL    MCH 28.0 27.0 - 31.0 pg    MCHC 32.6 32.0 - 36.0 g/dL    RDW 12.8 11.5 - 14.5 %    Platelets 264 150 - 350 K/uL    MPV 10.1 9.2 - 12.9 fL    Gran # (ANC) 3.4 1.8 - 7.7 K/uL    Lymph # 1.7 1.0 - 4.8 K/uL    Mono # 0.4 0.3 - 1.0 K/uL    Eos # 0.1 0.0 - 0.5 K/uL    Baso # 0.01 0.00 - 0.20 K/uL    Gran% 61.4 38.0 - 73.0 %    Lymph% 29.5 18.0 - 48.0 %    Mono% 6.8 4.0 - 15.0 %    Eosinophil% 2.1 0.0 - 8.0 %    Basophil% 0.2 0.0 - 1.9 %    Differential Method Automated    Comprehensive metabolic panel   Result Value Ref Range    Sodium 141 136 - 145 mmol/L    Potassium 3.8 3.5 - 5.1 mmol/L    Chloride 104 95 - 110 mmol/L    CO2 27 23 - 29 mmol/L    Glucose 96 70 - 110 mg/dL    BUN, Bld 13 6 - 20 mg/dL    Creatinine 0.7 0.5 - 1.4 mg/dL    Calcium 10.0 8.7 - 10.5 mg/dL    Total Protein 7.3 6.0 - 8.4 g/dL    Albumin 4.1 3.5 - 5.2 g/dL    Total Bilirubin 0.4 0.1 - 1.0 mg/dL    Alkaline Phosphatase 66 55 - 135 U/L    AST 16 10 - 40 U/L    ALT 16 10 - 44 U/L    Anion Gap 10 8 - 16 mmol/L    eGFR if African American >60 >60 mL/min/1.73 m^2    eGFR if non African American >60 >60 mL/min/1.73 m^2   Urinalysis, Reflex to Urine  Culture Urine, Clean Catch   Result Value Ref Range    Specimen UA Urine, Clean Catch     Color, UA Yellow Yellow, Straw, [place]    Appearance, UA Clear Clear    pH, UA 6.0 5.0 - 8.0    Specific Gravity, UA 1.030 1.005 - 1.030    Protein, UA Negative Negative    Glucose, UA Negative Negative    Ketones, UA Trace (A) Negative    Bilirubin (UA) Negative Negative    Occult Blood UA Trace (A) Negative    Nitrite, UA Negative Negative    Urobilinogen, UA Negative <2.0 EU/dL    Leukocytes, UA Negative Negative   Brain natriuretic peptide   Result Value Ref Range    BNP 17 0 - 99 pg/mL   CK   Result Value Ref Range    CPK 47 20 - 180 U/L   Troponin I   Result Value Ref Range    Troponin I <0.006 0.000 - 0.026 ng/mL     Assessment:       1. URI, acute    2. Crohn's disease with complication, unspecified gastrointestinal tract location    3. Breast cancer screening    4. Need for Tdap vaccination    5. Influenza vaccination declined    6. Pneumonia of lower lobe due to infectious organism, unspecified laterality        Plan:   URI, acute:  Due to influenza  -     betamethasone acetate-betamethasone sodium phosphate injection 9 mg  Rest and Increase oral  fluids  Wash hands frequently  Tylenol or Motrin every 8 hours as needed for fever, aches, or pain  Work excuse provided     Crohn's disease with complication, unspecified gastrointestinal tract location  -     betamethasone acetate-betamethasone sodium phosphate injection 9 mg    Breast cancer screening  -     Mammo Digital Screening Bilateral With CAD; Future; Expected date: 03/15/2019    Need for Tdap vaccination  -     (In Office Administered) Tdap Vaccine    Influenza vaccination declined    Pneumonia of lower lobe due to infectious organism, unspecified laterality  Comments:  per history

## 2019-03-15 NOTE — PATIENT INSTRUCTIONS

## 2019-03-15 NOTE — PROGRESS NOTES
Admin betamethasone 9 mg im to rv. Tolerated well. Recommended to wait for 15 mins for adverse reactions. Left in stable condition.    [employer] message sent to patient to schedule mammogram.

## 2019-04-08 ENCOUNTER — OFFICE VISIT (OUTPATIENT)
Dept: FAMILY MEDICINE | Facility: CLINIC | Age: 54
End: 2019-04-08
Payer: COMMERCIAL

## 2019-04-08 VITALS
WEIGHT: 154.13 LBS | SYSTOLIC BLOOD PRESSURE: 118 MMHG | BODY MASS INDEX: 28.36 KG/M2 | TEMPERATURE: 101 F | DIASTOLIC BLOOD PRESSURE: 80 MMHG | OXYGEN SATURATION: 98 % | HEART RATE: 106 BPM | HEIGHT: 62 IN

## 2019-04-08 DIAGNOSIS — K12.0 APHTHOUS ULCER OF MOUTH: Primary | ICD-10-CM

## 2019-04-08 DIAGNOSIS — M19.012 ARTHRITIS OF LEFT ACROMIOCLAVICULAR JOINT: ICD-10-CM

## 2019-04-08 DIAGNOSIS — M25.512 ACUTE PAIN OF LEFT SHOULDER: ICD-10-CM

## 2019-04-08 DIAGNOSIS — J11.1 INFLUENZA-LIKE ILLNESS: ICD-10-CM

## 2019-04-08 PROCEDURE — 99999 PR PBB SHADOW E&M-EST. PATIENT-LVL III: CPT | Mod: PBBFAC,,, | Performed by: FAMILY MEDICINE

## 2019-04-08 PROCEDURE — 3008F PR BODY MASS INDEX (BMI) DOCUMENTED: ICD-10-PCS | Mod: CPTII,S$GLB,, | Performed by: FAMILY MEDICINE

## 2019-04-08 PROCEDURE — 99214 PR OFFICE/OUTPT VISIT, EST, LEVL IV, 30-39 MIN: ICD-10-PCS | Mod: S$GLB,,, | Performed by: FAMILY MEDICINE

## 2019-04-08 PROCEDURE — 3008F BODY MASS INDEX DOCD: CPT | Mod: CPTII,S$GLB,, | Performed by: FAMILY MEDICINE

## 2019-04-08 PROCEDURE — 99214 OFFICE O/P EST MOD 30 MIN: CPT | Mod: S$GLB,,, | Performed by: FAMILY MEDICINE

## 2019-04-08 PROCEDURE — 99999 PR PBB SHADOW E&M-EST. PATIENT-LVL III: ICD-10-PCS | Mod: PBBFAC,,, | Performed by: FAMILY MEDICINE

## 2019-04-08 RX ORDER — MELOXICAM 15 MG/1
15 TABLET ORAL DAILY
Qty: 30 TABLET | Refills: 0 | Status: SHIPPED | OUTPATIENT
Start: 2019-04-08 | End: 2019-05-08

## 2019-04-08 RX ORDER — BUTALBITAL, ASPIRIN, AND CAFFEINE 325; 50; 40 MG/1; MG/1; MG/1
1 CAPSULE ORAL EVERY 4 HOURS PRN
COMMUNITY
End: 2019-08-25

## 2019-04-08 RX ORDER — TRIAMCINOLONE ACETONIDE 1 MG/G
PASTE DENTAL 2 TIMES DAILY
Qty: 5 G | Refills: 0 | Status: SHIPPED | OUTPATIENT
Start: 2019-04-08 | End: 2019-04-22

## 2019-04-08 NOTE — PATIENT INSTRUCTIONS
Canker Sore    A canker sore (also called an aphthous ulcer) is a painful sore on the lining of the mouth. It is most painful during the first few days, and it lasts about 7 to 14 days before going away.  Causes  Canker sores are not cold sores or fever blisters. They are not contagious, so they are not spread by contact. The exact cause of canker sores is not clear, but there are a number of things that can trigger them in different people.  · Mild injury, such as biting the inside of the mouth, lip, or cheek, or dental procedures  · Stress  · Poor diet, or lack of certain nutrients, including B vitamins and iron  · Foods that can irritate the mouth, including tomatoes, citrus fruits, and some nuts (foods that are acidic or contain bitter substances called tannins)  · Irritating chemicals, such as those in some toothpastes and mouthwashes  · Certain chronic illnesses  Symptoms  Canker sores are found on the lining of the mouth. They can be inside the cheeks or lips, on the roof of the mouth, at the base of the gums, on the tongue, or in the back of the throat. Canker sores typically have these characteristics:  · Small, flat (not raised) sores  · Can be white or yellowish bumps that are red around the edges or have a red halo  · Usually small in size, roundish, and in groups  · Accompanied by pain or burning  Canker sores do not leave a scar. But they usually come back.  Home care  The goals of canker sore treatment are to decrease the pain, speed healing, and prevent recurrence. No single treatment works for everyone. Try a number of techniques to see what works best.  General care  · You may find that soft, easy-to-chew foods cause less pain. Use a straw to direct liquids away from the sore.  · Use a soft-bristle toothbrush, and brush your teeth gently.  · Avoid acidic, salty, or spicy foods.  · Avoid injuring the inside of your mouth, or scraping your existing canker sores, by avoiding crusty and crunchy foods  like french bread and chips.  Medicines  You can try over-the-counter medicines that cover the sores and numb them. This protects the sores while they heal and helps reduce pain.  Homemade rinses and solutions  You can use these solutions as mouth rinses. Spit them out after using them. You can also dab them on the sores. You can repeat these treatments as often as needed.  · Rinse your mouth with saltwater.  · Mix equal amounts of hydrogen peroxide and water. You can use it as a mouthwash or dab it on spots with a cotton swab. You can also add sodium bicarbonate to this to make a paste, and then dab it on spots.  Follow-up care  Follow up with your healthcare provider, or as advised.  · If a culture was done, you will be notified if the treatment needs to be changed. You can call as directed for the results.  Call 911  Contact emergency services if any of these occur:  · Trouble breathing  · Inability to swallow  · Extreme drowsiness or trouble awakening  · Fainting or loss of consciousness  · Rapid heart rate  · Seizure  · Stiff neck  When to seek medical advice  Call your healthcare provider right away if any of these occur:  · You have a fever of 100.4°F (38°C) or higher.  · You are pregnant.  · You just had surgery or another medical procedure, or you were just discharged from the hospital.  · You are unable to eat or swallow due to pain.  Date Last Reviewed: 7/30/2015  © 7148-2761 [employer]. [address] [place] [address]. All rights reserved. This information is not intended as a substitute for professional medical care. Always follow your healthcare professional's instructions.        AC Arthritis (Acromioclavicular Arthritis)  Arthritis is a type of damage to a joint that can cause inflammation. AC arthritis affects the acromioclavicular (AC) joint. This joins the shoulder blade (scapula) and the collarbone (clavicle). The joint has ligaments and cartilage. Various things can  inflame the area around the joint. The damage is done by wear and tear over time or other causes. It can create pain, swelling, and a stiff feeling in the joint. This can limit a persons range of motion. AC arthritis is fairly common in older adults.  What causes AC arthritis?  AC arthritis can be caused by:  · Osteoarthritis. This happens from gradual wear and tear. Over time, the outer cartilage of the joint is worn away. This causes the bones of the joint to scrape together. It leads to stiffness, limited motion, pain, and inflammation.  · Rheumatoid arthritis (RA). The immune system attacks the lining of the joint. This leads to pain, limited motion, and inflammation.  · Injury to the joint. This can be a fracture or a shoulder separation. For example, a shoulder separation damages the ligaments around the AC joint. The bones may no longer line up correctly, and they may scrape against each other. This can cause inflammation, limited motion, and pain.  · Bacterial infection of the joint. This can cause sudden and severe inflammation of the joint.  Certain factors may increase your risk for AC arthritis. These include:  · Being an older adult  · Arthritis in other joints  · Having family members with arthritis  · Injury of your AC joint in the past  · A history of AC joint overuse  · A weak immune system, which increases the risk for joint infection  · Use of illegal intravenous medicines, which increases the risk for joint infection  Symptoms of AC arthritis  In most cases, the symptoms of AC arthritis start slowly and get worse with time. Some common symptoms include:  · Pain at the top of the shoulder that may spread to the side of the neck  · Snapping or clicking sound as you move your shoulder  · Limited range of motion, such as when lifting up your arm  Your symptoms might get worse with certain activities that use the joint. For example, lifting something over your head might cause pain. Crossing your arm  in front of your body might hurt. Over time, the joint pain may even keep you from sleeping well at night, especially when you lie on that shoulder.  Many people who have AC arthritis have symptoms in other joints or parts of the body. For example, if you have osteoarthritis, you may also have arthritis of your fingers, knees, or hips. People with RA also often get symptoms in several joints. If you have RA, you may have problems with other organs like your heart and lungs. And a person with a joint infection may have very sudden symptoms with fever and a lot of swelling.  Diagnosing AC arthritis  AC arthritis can be diagnosed by your primary healthcare provider. Or, it may be diagnosed by an orthopedic healthcare provider or rheumatologist. Your healthcare provider will ask about your medical history and your symptoms. He or she will give you a physical exam. This will include a careful exam of the shoulder, arm, and collarbone. This will likely include a test of your range of motion, your strength, and joint pain. Your healthcare provider may check your whole body for joint problems.  Your healthcare provider may also order imaging tests. These are to help diagnose the cause of your arthritis and see how severe it is. An X-ray is the most common first test. You may also need other tests, such as:  · Bone scan, to get more information  · MRI, if more detail is needed  · Injection of a pain medicine or steroid in the joint (diagnostic injection)  · Ultrasound of the joint, often done at the same time as a diagnostic injection  Treatment for AC arthritis  Your healthcare provider may advise these treatments for your AC arthritis:  · Changing the way you move your arm to avoid pain  · Taking over-the-counter pain medicines  · Doing physical therapy exercises  · Icing your shoulder a couple of times a day  · Taking a medicine to treat rheumatoid arthritis, if you have RA  · Taking dietary supplements such as  glucosamine  · Having corticosteroid injections in the joint to ease inflammation  If you still have severe symptoms after trying other treatments, your healthcare provider may talk with you about surgery. An orthopedic healthcare provider might advise a surgery called distal clavicle resection. This surgery removes a small amount of bone from the end of the collarbone. The bone then no longer scrapes the other bones of the joint. Your healthcare provider may do this through a small incision, using small tools and a tiny camera. Talk with your healthcare provider about the benefits and possible complications of this surgery.  Date Last Reviewed: 1/27/2016  © 3136-6916 [employer]. [address] [place] [address], [place] [address]. All rights reserved. This information is not intended as a substitute for professional medical care. Always follow your healthcare professional's instructions.

## 2019-04-08 NOTE — PROGRESS NOTES
Subjective:       Patient ID: Kamilla Leavitt is a 53 y.o. female.    Chief Complaint: Generalized Body Aches and Fever    Oral ulcer: x 6 days. 2 Shallow ulcers with white ring. Painful. Using over the counter oral gel. No improvement. Never had this before. She is a non smoker.  Patient has crohn's disease.    fever x one day. Associated with generalized body ache and chills. Denies cough and congestion. No change in appetite.    Left Shoulder pain x 2 week. New problem. Gradual onset. Diffuse poorly localized shoulder pain. Denies popping, crepitus . Advocates limited active ROM and inability to lay on it. Disturbs her sleep. She has not tried any med. Denies trauma.   Past Medical History:   Diagnosis Date    Crohn's colitis      Family History   Problem Relation Age of Onset    Arthritis Mother     COPD Mother     Heart disease Mother     Hyperlipidemia Mother     Arthritis Father     COPD Father     Heart disease Father     Hypertension Father     Vision loss Father     Cancer Neg Hx      Social History     Socioeconomic History    Marital status:      Spouse name: Not on file    Number of children: Not on file    Years of education: Not on file    Highest education level: Not on file   Occupational History    Not on file   Social Needs    Financial resource strain: Not on file    Food insecurity:     Worry: Not on file     Inability: Not on file    Transportation needs:     Medical: Not on file     Non-medical: Not on file   Tobacco Use    Smoking status: Former Smoker    Smokeless tobacco: Never Used   Substance and Sexual Activity    Alcohol use: No    Drug use: No    Sexual activity: Yes     Partners: Male   Lifestyle    Physical activity:     Days per week: Not on file     Minutes per session: Not on file    Stress: Not on file   Relationships    Social connections:     Talks on phone: Not on file     Gets together: Not on file     Attends Sikhism service: Not on file  "    Active member of club or organization: Not on file     Attends meetings of clubs or organizations: Not on file     Relationship status: Not on file   Other Topics Concern    Not on file   Social History Narrative    Not on file     Outpatient Encounter Medications as of 4/8/2019   Medication Sig Dispense Refill    butalbital-aspirin-caffeine -40 mg (FIORINAL) -40 mg Cap Take 1 capsule by mouth every 4 (four) hours as needed.      meloxicam (MOBIC) 15 MG tablet Take 1 tablet (15 mg total) by mouth once daily. 30 tablet 0    triamcinolone acetonide 0.1% (KENALOG) 0.1 % paste Place onto teeth 2 (two) times daily. for 14 days 5 g 0     No facility-administered encounter medications on file as of 4/8/2019.        Review of Systems   Constitutional: Positive for activity change and fever. Negative for chills.   HENT: Negative for congestion and facial swelling.    Eyes: Negative for discharge and itching.   Respiratory: Negative for cough and wheezing.    Cardiovascular: Negative for chest pain and palpitations.   Gastrointestinal: Negative for abdominal pain, nausea and vomiting.   Endocrine: Negative for cold intolerance and heat intolerance.   Genitourinary: Negative for dysuria and flank pain.   Musculoskeletal: Positive for arthralgias and myalgias. Negative for neck stiffness.   Skin: Negative for pallor and wound.   Neurological: Negative for facial asymmetry and weakness.   Psychiatric/Behavioral: Negative for agitation and suicidal ideas.       Objective:      /80 (BP Location: Right arm, Patient Position: Sitting, BP Method: Medium (Manual))   Pulse 106   Temp (!) 100.6 °F (38.1 °C) (Oral)   Ht 5' 2" (1.575 m)   Wt 69.9 kg (154 lb 1.6 oz)   SpO2 98%   BMI 28.19 kg/m²   Physical Exam   Constitutional: She is oriented to person, place, and time. She appears well-developed. No distress.   febrile   HENT:   Head: Normocephalic and atraumatic.   Right Ear: External ear normal.   Left " Ear: External ear normal.   Mouth/Throat: Oral lesions present.   X one oral lesion-shallow consistent with aphthous ulcer   Eyes: Conjunctivae and EOM are normal.   Neck: Neck supple. No thyromegaly present.   Cardiovascular: Normal rate and regular rhythm.   Pulmonary/Chest: Effort normal. No respiratory distress.   Abdominal: Soft. She exhibits no distension.   Genitourinary:   Genitourinary Comments: deferred   Musculoskeletal: She exhibits no edema or deformity.        Right shoulder: She exhibits decreased range of motion and tenderness. She exhibits no bony tenderness, no swelling, no effusion, no crepitus, no deformity, no laceration, no pain, normal pulse and normal strength.        Arms:    Tender at the AC joint  Cross arm is negative,  Impingement is negative.  Full passive ROM-adduction, abduction, external and internal rotation   Lymphadenopathy:        Head (right side): No submandibular adenopathy present.        Head (left side): No submandibular adenopathy present.     She has no cervical adenopathy.   Neurological: She is alert and oriented to person, place, and time.   Skin: Skin is warm and dry.   Psychiatric: She has a normal mood and affect. Her behavior is normal.   Nursing note and vitals reviewed.      Results for orders placed or performed during the hospital encounter of 12/10/18   CBC auto differential   Result Value Ref Range    WBC 5.59 3.90 - 12.70 K/uL    RBC 4.75 4.00 - 5.40 M/uL    Hemoglobin 13.3 12.0 - 16.0 g/dL    Hematocrit 40.8 37.0 - 48.5 %    MCV 86 82 - 98 fL    MCH 28.0 27.0 - 31.0 pg    MCHC 32.6 32.0 - 36.0 g/dL    RDW 12.8 11.5 - 14.5 %    Platelets 264 150 - 350 K/uL    MPV 10.1 9.2 - 12.9 fL    Gran # (ANC) 3.4 1.8 - 7.7 K/uL    Lymph # 1.7 1.0 - 4.8 K/uL    Mono # 0.4 0.3 - 1.0 K/uL    Eos # 0.1 0.0 - 0.5 K/uL    Baso # 0.01 0.00 - 0.20 K/uL    Gran% 61.4 38.0 - 73.0 %    Lymph% 29.5 18.0 - 48.0 %    Mono% 6.8 4.0 - 15.0 %    Eosinophil% 2.1 0.0 - 8.0 %    Basophil%  0.2 0.0 - 1.9 %    Differential Method Automated    Comprehensive metabolic panel   Result Value Ref Range    Sodium 141 136 - 145 mmol/L    Potassium 3.8 3.5 - 5.1 mmol/L    Chloride 104 95 - 110 mmol/L    CO2 27 23 - 29 mmol/L    Glucose 96 70 - 110 mg/dL    BUN, Bld 13 6 - 20 mg/dL    Creatinine 0.7 0.5 - 1.4 mg/dL    Calcium 10.0 8.7 - 10.5 mg/dL    Total Protein 7.3 6.0 - 8.4 g/dL    Albumin 4.1 3.5 - 5.2 g/dL    Total Bilirubin 0.4 0.1 - 1.0 mg/dL    Alkaline Phosphatase 66 55 - 135 U/L    AST 16 10 - 40 U/L    ALT 16 10 - 44 U/L    Anion Gap 10 8 - 16 mmol/L    eGFR if African American >60 >60 mL/min/1.73 m^2    eGFR if non African American >60 >60 mL/min/1.73 m^2                                                                                                                                     Assessment:       1. Aphthous ulcer of mouth    2. Influenza-like illness    3. Acute pain of left shoulder    4. Arthritis of left acromioclavicular joint        Plan:   Aphthous ulcer of mouth  -     triamcinolone acetonide 0.1% (KENALOG) 0.1 % paste; Place onto teeth 2 (two) times daily. for 14 days  Dispense: 5 g; Refill: 0    Influenza-like illness  -     POCT Influenza A/B-I reviewed it and it was neg for influenza A/B    Acute pain of left shoulder  -     meloxicam (MOBIC) 15 MG tablet; Take 1 tablet (15 mg total) by mouth once daily.  Dispense: 30 tablet; Refill: 0    Arthritis of left acromioclavicular joint  -     meloxicam (MOBIC) 15 MG tablet; Take 1 tablet (15 mg total) by mouth once daily.  Dispense: 30 tablet; Refill: 0

## 2019-05-07 DIAGNOSIS — Z12.11 COLON CANCER SCREENING: ICD-10-CM

## 2019-07-01 ENCOUNTER — PATIENT OUTREACH (OUTPATIENT)
Dept: ADMINISTRATIVE | Facility: HOSPITAL | Age: 54
End: 2019-07-01

## 2019-07-01 NOTE — PROGRESS NOTES
I spoke to pt that stated she has recently changed insurance company's and is not with [facility]. Pt is hopeing her new insurance company will cover more than her last company so that she can her Colonoscopy. Pt has not scheduled her mammogram but hopes to schedule that soon. Pt verbalized understanding of needed appt.

## 2019-07-31 ENCOUNTER — HOSPITAL ENCOUNTER (OUTPATIENT)
Dept: RADIOLOGY | Facility: HOSPITAL | Age: 54
Discharge: HOME | End: 2019-07-31
Attending: FAMILY MEDICINE
Payer: COMMERCIAL

## 2019-07-31 ENCOUNTER — OFFICE VISIT (OUTPATIENT)
Dept: FAMILY MEDICINE | Facility: CLINIC | Age: 54
End: 2019-07-31
Payer: COMMERCIAL

## 2019-07-31 VITALS
TEMPERATURE: 98 F | BODY MASS INDEX: 27.75 KG/M2 | OXYGEN SATURATION: 98 % | WEIGHT: 150.81 LBS | SYSTOLIC BLOOD PRESSURE: 110 MMHG | HEART RATE: 68 BPM | HEIGHT: 62 IN | DIASTOLIC BLOOD PRESSURE: 70 MMHG

## 2019-07-31 DIAGNOSIS — R20.2 NUMBNESS AND TINGLING OF RIGHT ARM: ICD-10-CM

## 2019-07-31 DIAGNOSIS — M54.2 NECK PAIN: Primary | ICD-10-CM

## 2019-07-31 DIAGNOSIS — R20.0 NUMBNESS AND TINGLING OF RIGHT ARM: ICD-10-CM

## 2019-07-31 DIAGNOSIS — M54.2 NECK PAIN: ICD-10-CM

## 2019-07-31 PROCEDURE — 3008F BODY MASS INDEX DOCD: CPT | Mod: CPTII,S$GLB,, | Performed by: FAMILY MEDICINE

## 2019-07-31 PROCEDURE — 3008F PR BODY MASS INDEX (BMI) DOCUMENTED: ICD-10-PCS | Mod: CPTII,S$GLB,, | Performed by: FAMILY MEDICINE

## 2019-07-31 PROCEDURE — 99999 PR PBB SHADOW E&M-EST. PATIENT-LVL III: CPT | Mod: PBBFAC,,, | Performed by: FAMILY MEDICINE

## 2019-07-31 PROCEDURE — 72040 X-RAY EXAM NECK SPINE 2-3 VW: CPT | Mod: TC,FY,PO

## 2019-07-31 PROCEDURE — 72040 XR CERVICAL SPINE AP LATERAL: ICD-10-PCS | Mod: 26,,, | Performed by: RADIOLOGY

## 2019-07-31 PROCEDURE — 72040 X-RAY EXAM NECK SPINE 2-3 VW: CPT | Mod: 26,,, | Performed by: RADIOLOGY

## 2019-07-31 PROCEDURE — 99214 PR OFFICE/OUTPT VISIT, EST, LEVL IV, 30-39 MIN: ICD-10-PCS | Mod: S$GLB,,, | Performed by: FAMILY MEDICINE

## 2019-07-31 PROCEDURE — 99214 OFFICE O/P EST MOD 30 MIN: CPT | Mod: S$GLB,,, | Performed by: FAMILY MEDICINE

## 2019-07-31 PROCEDURE — 99999 PR PBB SHADOW E&M-EST. PATIENT-LVL III: ICD-10-PCS | Mod: PBBFAC,,, | Performed by: FAMILY MEDICINE

## 2019-07-31 NOTE — PROGRESS NOTES
Chief Complaint:    Chief Complaint   Patient presents with    Annual Exam     weight gain,edema, neck/arm numbness       History of Present Illness:    Presents today complaining of neck pain for about a month number right side worse when she turns her head to the right.  The pain does not radiate no tingling numbness or weakness  Will also complains of numbness of the right hand comes and goes but no weakness.      ROS:  Review of Systems   Constitutional: Negative for activity change, chills, fatigue, fever and unexpected weight change.   HENT: Negative for congestion, ear discharge, ear pain, hearing loss, postnasal drip and rhinorrhea.    Eyes: Negative for pain and visual disturbance.   Respiratory: Negative for cough, chest tightness and shortness of breath.    Cardiovascular: Negative for chest pain and palpitations.   Gastrointestinal: Negative for abdominal pain, diarrhea and vomiting.   Endocrine: Negative for heat intolerance.   Genitourinary: Negative for dysuria, flank pain, frequency and hematuria.   Musculoskeletal: Negative for back pain, gait problem and neck pain.   Skin: Negative for color change and rash.   Neurological: Negative for dizziness, tremors, seizures, numbness and headaches.   Psychiatric/Behavioral: Negative for agitation, hallucinations, self-injury, sleep disturbance and suicidal ideas. The patient is not nervous/anxious.        Past Medical History:   Diagnosis Date    Crohn's colitis        Social History:  Social History     Socioeconomic History    Marital status:      Spouse name: Not on file    Number of children: Not on file    Years of education: Not on file    Highest education level: Not on file   Occupational History    Not on file   Social Needs    Financial resource strain: Not on file    Food insecurity:     Worry: Not on file     Inability: Not on file    Transportation needs:     Medical: Not on file     Non-medical: Not on file   Tobacco Use     "Smoking status: Former Smoker    Smokeless tobacco: Never Used   Substance and Sexual Activity    Alcohol use: No    Drug use: No    Sexual activity: Yes     Partners: Male   Lifestyle    Physical activity:     Days per week: Not on file     Minutes per session: Not on file    Stress: Not on file   Relationships    Social connections:     Talks on phone: Not on file     Gets together: Not on file     Attends Quaker service: Not on file     Active member of club or organization: Not on file     Attends meetings of clubs or organizations: Not on file     Relationship status: Not on file   Other Topics Concern    Not on file   Social History Narrative    Not on file       Family History:   family history includes Arthritis in her father and mother; COPD in her father and mother; Heart disease in her father and mother; Hyperlipidemia in her mother; Hypertension in her father; Vision loss in her father.    Health Maintenance   Topic Date Due    TETANUS VACCINE  04/14/1985    Lipid Panel  06/23/2016    Pneumococcal Vaccine (Medium Risk) (1 of 1 - PPSV23) 04/24/2020 (Originally 4/14/1986)    Colonoscopy  08/05/2020 (Originally 4/14/2017)    Mammogram  09/07/2020 (Originally 4/14/2007)    Influenza Vaccine  08/01/2019       Physical Exam:    Vital Signs  Temp: 98.2 °F (36.8 °C)  Temp src: Oral  Pulse: 68  SpO2: 98 %  BP: 110/70  Pain Score: 0-No pain  Height and Weight  Height: 5' 2" (157.5 cm)  Weight: 68.4 kg (150 lb 12.7 oz)  BSA (Calculated - sq m): 1.73 sq meters  BMI (Calculated): 27.6  Weight in (lb) to have BMI = 25: 136.4]    Body mass index is 27.58 kg/m².    Physical Exam   Constitutional: She is oriented to person, place, and time. She appears well-developed.   HENT:   Mouth/Throat: Oropharynx is clear and moist.   Eyes: Pupils are equal, round, and reactive to light. Conjunctivae are normal.   Neck: Neck supple. Muscular tenderness present. Decreased range of motion present. "       Cardiovascular: Normal rate, regular rhythm and normal heart sounds.   No murmur heard.  Pulmonary/Chest: Effort normal and breath sounds normal. No respiratory distress. She has no wheezes. She has no rales. She exhibits no tenderness.   Abdominal: Soft. She exhibits no distension and no mass. There is no tenderness. There is no guarding.   Musculoskeletal: She exhibits no edema or tenderness.   Lymphadenopathy:     She has no cervical adenopathy.   Neurological: She is alert and oriented to person, place, and time. She has normal reflexes.   Bilateral upper extremity no weakness or loss of sensation discovered objectively  Tinnel's sign is negative   Skin: Skin is warm and dry.   Psychiatric: She has a normal mood and affect. Her behavior is normal. Judgment and thought content normal.         Assessment:      ICD-10-CM ICD-9-CM   1. Neck pain M54.2 723.1   2. Numbness and tingling of right arm R20.0 782.0    R20.2          Plan:        Will start with x-ray of the neck and will need a nerve conduction study of the upper extremity.  Differential include cervical radiculopathy, DJD of the neck, muscular neck pain and carpal tunnel.    Discuss age-appropriate screening the patient namely mammography, colonoscopy she refused.  Hazards of delayed testing or not testing discussed with the patient.  She understood        Orders Placed This Encounter   Procedures    X-Ray Cervical Spine AP And Lateral    Nerve conduction test       Current Outpatient Medications   Medication Sig Dispense Refill    butalbital-aspirin-caffeine -40 mg (FIORINAL) -40 mg Cap Take 1 capsule by mouth every 4 (four) hours as needed.       No current facility-administered medications for this visit.        There are no discontinued medications.    No follow-ups on file.      Carmen Shea MD

## 2019-08-02 ENCOUNTER — PATIENT MESSAGE (OUTPATIENT)
Dept: FAMILY MEDICINE | Facility: CLINIC | Age: 54
End: 2019-08-02

## 2019-08-06 DIAGNOSIS — R20.0 NUMBNESS AND TINGLING OF RIGHT ARM: Primary | ICD-10-CM

## 2019-08-06 DIAGNOSIS — R20.2 NUMBNESS AND TINGLING OF RIGHT ARM: Primary | ICD-10-CM

## 2019-08-25 ENCOUNTER — HOSPITAL ENCOUNTER (EMERGENCY)
Facility: HOSPITAL | Age: 54
Discharge: HOME | End: 2019-08-25
Attending: EMERGENCY MEDICINE
Payer: COMMERCIAL

## 2019-08-25 VITALS
DIASTOLIC BLOOD PRESSURE: 67 MMHG | TEMPERATURE: 98 F | WEIGHT: 153.13 LBS | HEIGHT: 62 IN | BODY MASS INDEX: 28.18 KG/M2 | SYSTOLIC BLOOD PRESSURE: 125 MMHG | RESPIRATION RATE: 20 BRPM | OXYGEN SATURATION: 96 % | HEART RATE: 66 BPM

## 2019-08-25 DIAGNOSIS — R07.9 CHEST PAIN: ICD-10-CM

## 2019-08-25 DIAGNOSIS — M54.12 CERVICAL RADICULOPATHY: ICD-10-CM

## 2019-08-25 DIAGNOSIS — M54.2 CERVICAL PAIN (NECK): Primary | ICD-10-CM

## 2019-08-25 LAB
ALBUMIN SERPL BCP-MCNC: 3.5 G/DL (ref 3.5–5.2)
ALP SERPL-CCNC: 66 U/L (ref 55–135)
ALT SERPL W/O P-5'-P-CCNC: 13 U/L (ref 10–44)
ANION GAP SERPL CALC-SCNC: 10 MMOL/L (ref 8–16)
AST SERPL-CCNC: 10 U/L (ref 10–40)
BASOPHILS # BLD AUTO: 0.01 K/UL (ref 0–0.2)
BASOPHILS NFR BLD: 0.1 % (ref 0–1.9)
BILIRUB SERPL-MCNC: 0.3 MG/DL (ref 0.1–1)
BNP SERPL-MCNC: 22 PG/ML (ref 0–99)
BUN SERPL-MCNC: 12 MG/DL (ref 6–20)
CALCIUM SERPL-MCNC: 9.5 MG/DL (ref 8.7–10.5)
CHLORIDE SERPL-SCNC: 105 MMOL/L (ref 95–110)
CO2 SERPL-SCNC: 25 MMOL/L (ref 23–29)
CREAT SERPL-MCNC: 0.7 MG/DL (ref 0.5–1.4)
D DIMER PPP IA.FEU-MCNC: 0.34 MG/L FEU
DIFFERENTIAL METHOD: NORMAL
EOSINOPHIL # BLD AUTO: 0.1 K/UL (ref 0–0.5)
EOSINOPHIL NFR BLD: 1.2 % (ref 0–8)
ERYTHROCYTE [DISTWIDTH] IN BLOOD BY AUTOMATED COUNT: 13.5 % (ref 11.5–14.5)
EST. GFR  (AFRICAN AMERICAN): >60 ML/MIN/1.73 M^2
EST. GFR  (NON AFRICAN AMERICAN): >60 ML/MIN/1.73 M^2
GLUCOSE SERPL-MCNC: 102 MG/DL (ref 70–110)
HCT VFR BLD AUTO: 37.9 % (ref 37–48.5)
HGB BLD-MCNC: 12.7 G/DL (ref 12–16)
LYMPHOCYTES # BLD AUTO: 1.7 K/UL (ref 1–4.8)
LYMPHOCYTES NFR BLD: 22.9 % (ref 18–48)
MCH RBC QN AUTO: 28.2 PG (ref 27–31)
MCHC RBC AUTO-ENTMCNC: 33.5 G/DL (ref 32–36)
MCV RBC AUTO: 84 FL (ref 82–98)
MONOCYTES # BLD AUTO: 0.7 K/UL (ref 0.3–1)
MONOCYTES NFR BLD: 9.2 % (ref 4–15)
NEUTROPHILS # BLD AUTO: 5 K/UL (ref 1.8–7.7)
NEUTROPHILS NFR BLD: 66.7 % (ref 38–73)
PLATELET # BLD AUTO: 240 K/UL (ref 150–350)
PMV BLD AUTO: 10 FL (ref 9.2–12.9)
POTASSIUM SERPL-SCNC: 3.8 MMOL/L (ref 3.5–5.1)
PROT SERPL-MCNC: 6.9 G/DL (ref 6–8.4)
RBC # BLD AUTO: 4.51 M/UL (ref 4–5.4)
SODIUM SERPL-SCNC: 140 MMOL/L (ref 136–145)
TROPONIN I SERPL DL<=0.01 NG/ML-MCNC: 0.02 NG/ML (ref 0–0.03)
TROPONIN I SERPL DL<=0.01 NG/ML-MCNC: <0.006 NG/ML (ref 0–0.03)
WBC # BLD AUTO: 7.54 K/UL (ref 3.9–12.7)

## 2019-08-25 PROCEDURE — 93010 ELECTROCARDIOGRAM REPORT: CPT | Mod: ,,, | Performed by: INTERNAL MEDICINE

## 2019-08-25 PROCEDURE — 93010 EKG 12-LEAD: ICD-10-PCS | Mod: ,,, | Performed by: INTERNAL MEDICINE

## 2019-08-25 PROCEDURE — 84484 ASSAY OF TROPONIN QUANT: CPT

## 2019-08-25 PROCEDURE — 85379 FIBRIN DEGRADATION QUANT: CPT

## 2019-08-25 PROCEDURE — 36415 COLL VENOUS BLD VENIPUNCTURE: CPT

## 2019-08-25 PROCEDURE — 83880 ASSAY OF NATRIURETIC PEPTIDE: CPT

## 2019-08-25 PROCEDURE — 25000003 PHARM REV CODE 250: Performed by: EMERGENCY MEDICINE

## 2019-08-25 PROCEDURE — 85025 COMPLETE CBC W/AUTO DIFF WBC: CPT

## 2019-08-25 PROCEDURE — 99285 EMERGENCY DEPT VISIT HI MDM: CPT | Mod: 25

## 2019-08-25 PROCEDURE — 93005 ELECTROCARDIOGRAM TRACING: CPT

## 2019-08-25 PROCEDURE — 80053 COMPREHEN METABOLIC PANEL: CPT

## 2019-08-25 RX ORDER — METHOCARBAMOL 500 MG/1
1000 TABLET, FILM COATED ORAL 3 TIMES DAILY
Qty: 30 TABLET | Refills: 0 | Status: SHIPPED | OUTPATIENT
Start: 2019-08-25 | End: 2019-08-30

## 2019-08-25 RX ORDER — ASPIRIN 325 MG
325 TABLET ORAL
Status: COMPLETED | OUTPATIENT
Start: 2019-08-25 | End: 2019-08-25

## 2019-08-25 RX ORDER — METHOCARBAMOL 500 MG/1
1000 TABLET, FILM COATED ORAL
Status: COMPLETED | OUTPATIENT
Start: 2019-08-25 | End: 2019-08-25

## 2019-08-25 RX ORDER — IBUPROFEN 600 MG/1
600 TABLET ORAL
Status: COMPLETED | OUTPATIENT
Start: 2019-08-25 | End: 2019-08-25

## 2019-08-25 RX ORDER — ACETAMINOPHEN 500 MG
1000 TABLET ORAL
Status: COMPLETED | OUTPATIENT
Start: 2019-08-25 | End: 2019-08-25

## 2019-08-25 RX ADMIN — ACETAMINOPHEN 1000 MG: 500 TABLET ORAL at 09:08

## 2019-08-25 RX ADMIN — ASPIRIN 325 MG ORAL TABLET 325 MG: 325 PILL ORAL at 05:08

## 2019-08-25 RX ADMIN — METHOCARBAMOL TABLETS 1000 MG: 500 TABLET, COATED ORAL at 09:08

## 2019-08-25 RX ADMIN — IBUPROFEN 600 MG: 600 TABLET ORAL at 09:08

## 2019-08-25 NOTE — ED PROVIDER NOTES
"SCRIBE #1 NOTE: I, Lady Kingston, am scribing for, and in the presence of, Andrae Albrecht MD. I have scribed the entire note.      History      Chief Complaint   Patient presents with    Chest Pain     R sided chest pain that started last night with body aches. Tested negative for flu today. Pt denies N/V/D.        Review of patient's allergies indicates:   Allergen Reactions    Codeine Hives and Nausea Only        HPI   HPI    8/25/2019, 6:17 PM   History obtained from the patient      History of Present Illness: Kamilla Leavitt is a 54 y.o. female patient who presents to the Emergency Department for evaluation of chest pain which onset last night. Symptoms are constant and rated 5/10 in severity. Patient describes her pain as "sharp" in the Right side of her chest. No mitigating or exacerbating factors reported. Associated sxs include RUE numbness, cough, and myalgias. Patient is also c/o neck pain that has been addressed by her doctor a few weeks ago; she reports receiving an Xray that resulted negative and is scheduled to have a nerve conduction study. Patient denies any fever, N/V, diaphoresis, SOB, palpitations, leg pain/swelling, and all other sxs at this time. Prior Tx includes Asprin. No further complaints or concerns at this time.       Arrival mode: Personal vehicle      PCP: Carmen Shea MD       Past Medical History:  Past Medical History:   Diagnosis Date    Crohn's colitis        Past Surgical History:  Past Surgical History:   Procedure Laterality Date    ABDOMINAL SURGERY      bladder tuck      CHOLECYSTECTOMY      COLOSTOMY      COLOSTOMY CLOSURE      HERNIA REPAIR      HYSTERECTOMY      TONSILLECTOMY           Family History:  Family History   Problem Relation Age of Onset    Arthritis Mother     COPD Mother     Heart disease Mother     Hyperlipidemia Mother     Arthritis Father     COPD Father     Heart disease Father     Hypertension Father     Vision loss Father     Cancer " "Neg Hx        Social History:  Social History     Tobacco Use    Smoking status: Former Smoker    Smokeless tobacco: Never Used   Substance and Sexual Activity    Alcohol use: No    Drug use: No    Sexual activity: Yes     Partners: Male       ROS   Review of Systems   Constitutional: Negative for diaphoresis and fever.   HENT: Negative for sore throat.    Respiratory: Positive for cough. Negative for shortness of breath.    Cardiovascular: Positive for chest pain (R side, "sharp"). Negative for palpitations and leg swelling.   Gastrointestinal: Negative for nausea and vomiting.   Genitourinary: Negative for dysuria.   Musculoskeletal: Positive for myalgias and neck pain. Negative for back pain.   Skin: Negative for rash.   Neurological: Positive for numbness (RUE). Negative for weakness.   Hematological: Does not bruise/bleed easily.   All other systems reviewed and are negative.    Physical Exam      Initial Vitals [08/25/19 1639]   BP Pulse Resp Temp SpO2   139/79 81 20 98.9 °F (37.2 °C) 97 %      MAP       --          Physical Exam  Nursing Notes and Vital Signs Reviewed.  Constitutional: Patient is in no acute distress. Well-developed and well-nourished.  Head: Atraumatic. Normocephalic.  Eyes: PERRL. EOM intact. Conjunctivae are not pale. No scleral icterus.  ENT: Mucous membranes are moist.    Neck: Supple. Full ROM. No lymphadenopathy. There is Right cervical paraspinal tenderness.  Cardiovascular: Regular rate. Regular rhythm. No murmurs, rubs, or gallops. Distal pulses are 2+ and symmetric.  Pulmonary/Chest: No respiratory distress. Clear to auscultation bilaterally. No wheezing or rales.  Abdominal: Soft and non-distended.  There is no tenderness.  No rebound, guarding, or rigidity.  Genitourinary: No CVA tenderness  Musculoskeletal: Moves all extremities. No obvious deformities. No edema. No calf tenderness.  Skin: Warm and dry.  Neurological:  Alert, awake, and appropriate.  Normal speech.  NIH= 0. " "No acute focal neurological deficits are appreciated.  Psychiatric: Normal affect. Good eye contact. Appropriate in content.    ED Course    Procedures  ED Vital Signs:  Vitals:    08/25/19 1638 08/25/19 1639 08/25/19 1714 08/25/19 1832   BP:  139/79  (!) 147/73   Pulse:  81 74 68   Resp:  20  (!) 25   Temp:  98.9 °F (37.2 °C)     TempSrc:  Oral     SpO2:  97%  97%   Weight: 69.4 kg (153 lb 1.8 oz)      Height: 5' 2" (1.575 m)       08/25/19 1900 08/25/19 2017 08/25/19 2100 08/25/19 2105   BP: 137/73 121/66 125/67    Pulse: 71 69 66    Resp: (!) 24 20 20    Temp:    98.2 °F (36.8 °C)   TempSrc:       SpO2: 96% 97% 96%    Weight:       Height:           Abnormal Lab Results:  Labs Reviewed   CBC W/ AUTO DIFFERENTIAL   COMPREHENSIVE METABOLIC PANEL   TROPONIN I   TROPONIN I   B-TYPE NATRIURETIC PEPTIDE   D DIMER, QUANTITATIVE   D DIMER, QUANTITATIVE        All Lab Results:  Results for orders placed or performed during the hospital encounter of 08/25/19   CBC auto differential   Result Value Ref Range    WBC 7.54 3.90 - 12.70 K/uL    RBC 4.51 4.00 - 5.40 M/uL    Hemoglobin 12.7 12.0 - 16.0 g/dL    Hematocrit 37.9 37.0 - 48.5 %    Mean Corpuscular Volume 84 82 - 98 fL    Mean Corpuscular Hemoglobin 28.2 27.0 - 31.0 pg    Mean Corpuscular Hemoglobin Conc 33.5 32.0 - 36.0 g/dL    RDW 13.5 11.5 - 14.5 %    Platelets 240 150 - 350 K/uL    MPV 10.0 9.2 - 12.9 fL    Gran # (ANC) 5.0 1.8 - 7.7 K/uL    Lymph # 1.7 1.0 - 4.8 K/uL    Mono # 0.7 0.3 - 1.0 K/uL    Eos # 0.1 0.0 - 0.5 K/uL    Baso # 0.01 0.00 - 0.20 K/uL    Gran% 66.7 38.0 - 73.0 %    Lymph% 22.9 18.0 - 48.0 %    Mono% 9.2 4.0 - 15.0 %    Eosinophil% 1.2 0.0 - 8.0 %    Basophil% 0.1 0.0 - 1.9 %    Differential Method Automated    Comprehensive metabolic panel   Result Value Ref Range    Sodium 140 136 - 145 mmol/L    Potassium 3.8 3.5 - 5.1 mmol/L    Chloride 105 95 - 110 mmol/L    CO2 25 23 - 29 mmol/L    Glucose 102 70 - 110 mg/dL    BUN, Bld 12 6 - 20 mg/dL    " Creatinine 0.7 0.5 - 1.4 mg/dL    Calcium 9.5 8.7 - 10.5 mg/dL    Total Protein 6.9 6.0 - 8.4 g/dL    Albumin 3.5 3.5 - 5.2 g/dL    Total Bilirubin 0.3 0.1 - 1.0 mg/dL    Alkaline Phosphatase 66 55 - 135 U/L    AST 10 10 - 40 U/L    ALT 13 10 - 44 U/L    Anion Gap 10 8 - 16 mmol/L    eGFR if African American >60 >60 mL/min/1.73 m^2    eGFR if non African American >60 >60 mL/min/1.73 m^2   Troponin I #1   Result Value Ref Range    Troponin I <0.006 0.000 - 0.026 ng/mL   Troponin I #2   Result Value Ref Range    Troponin I 0.018 0.000 - 0.026 ng/mL   B-Type natriuretic peptide (BNP)   Result Value Ref Range    BNP 22 0 - 99 pg/mL   D dimer, quantitative   Result Value Ref Range    D-Dimer 0.34 <0.50 mg/L FEU         Imaging Results:  Imaging Results          X-Ray Chest AP Portable (Final result)  Result time 08/25/19 17:09:42    Final result by Matteo Duarte MD (08/25/19 17:09:42)                 Impression:      Normal chest x-ray.      Electronically signed by: Matteo Duarte MD  Date:    08/25/2019  Time:    17:09             Narrative:    EXAMINATION:  XR CHEST AP PORTABLE    CLINICAL HISTORY:  Chest Pain;    FINDINGS:  Comparison study 12/10/2018.  No change.  Normal size heart.  Lungs are clear.                                    The EKG was ordered, reviewed, and independently interpreted by the ED provider.  Interpretation time: 16:45  Rate: 82 BPM  Rhythm: normal sinus rhythm  Interpretation: Possible Left atrial enlargement. No STEMI.      The Emergency Provider reviewed the vital signs and test results, which are outlined above.    ED Discussion     9:00 PM: Reassessed pt at this time.  Pt states her condition has improved at this time. Discussed with pt all pertinent ED information and results. Discussed pt dx and plan of tx. Gave pt all f/u and return to the ED instructions. All questions and concerns were addressed at this time. Pt expresses understanding of information and instructions, and is  comfortable with plan to discharge. Pt is stable for discharge.    I discussed with patient and/or family/caretaker that evaluation in the ED does not suggest any emergent or life threatening medical conditions requiring immediate intervention beyond what was provided in the ED, and I believe patient is safe for discharge.  Regardless, an unremarkable evaluation in the ED does not preclude the development or presence of a serious of life threatening condition. As such, patient was instructed to return immediately for any worsening or change in current symptoms.    ED Medication(s):  Medications   aspirin tablet 325 mg (325 mg Oral Given 8/25/19 1703)   methocarbamol tablet 1,000 mg (1,000 mg Oral Given 8/25/19 2105)   ibuprofen tablet 600 mg (600 mg Oral Given 8/25/19 2105)   acetaminophen tablet 1,000 mg (1,000 mg Oral Given 8/25/19 2105)     Current Discharge Medication List      START taking these medications    Details   methocarbamol (ROBAXIN) 500 MG Tab Take 2 tablets (1,000 mg total) by mouth 3 (three) times daily. for 5 days  Qty: 30 tablet, Refills: 0             Follow-up Information     Carmen Shea MD. Schedule an appointment as soon as possible for a visit in 3 days.    Specialty:  Family Medicine  Why:  For re-evaluation and further treatment  Contact information:  [address] [address] [address] [phone]             [facility]. Go today.    Specialty:  Emergency Medicine  Why:  If symptoms worsen, For re-evaluation and further treatment  Contact information:  [address] [address]  [facility] [address] [phone]                   Medical Decision Making    Medical Decision Making:   Clinical Tests:   Lab Tests: Ordered and Reviewed  Radiological Study: Ordered and Reviewed  Medical Tests: Ordered and Reviewed         ED Course as of Aug 26 0206   Sun Aug 25, 2019   1845 Heart score is 3.     [BA]      ED Course User Index  [BA] Andrae Albrecht MD          Scribe Attestation:   Scribe #1: I performed the above scribed service and the documentation accurately describes the services I performed. I attest to the accuracy of the note.    Attending:   Physician Attestation Statement for Scribe #1: I, Andrae Albrecht MD, personally performed the services described in this documentation, as scribed by Lady Kingston, in my presence, and it is both accurate and complete.          Clinical Impression       ICD-10-CM ICD-9-CM   1. Cervical pain (neck) M54.2 723.1   2. Chest pain R07.9 786.50   3. Cervical radiculopathy M54.12 723.4       Disposition:   Disposition: Discharged  Condition: Stable         Andrae Albrecht MD  08/26/19 0206

## 2019-08-26 NOTE — ED NOTES
D-dimer machine in lab is down and all d-dimers are being sent to [facility] which is causing a delay in results. Dr Albrecht aware. Pt updated.

## 2019-10-02 ENCOUNTER — TELEPHONE (OUTPATIENT)
Dept: NEUROLOGY | Facility: CLINIC | Age: 54
End: 2019-10-02

## 2019-10-10 ENCOUNTER — TELEPHONE (OUTPATIENT)
Dept: NEUROLOGY | Facility: CLINIC | Age: 54
End: 2019-10-10

## 2019-10-10 ENCOUNTER — PATIENT MESSAGE (OUTPATIENT)
Dept: FAMILY MEDICINE | Facility: CLINIC | Age: 54
End: 2019-10-10

## 2019-12-06 ENCOUNTER — PATIENT MESSAGE (OUTPATIENT)
Dept: ADMINISTRATIVE | Facility: HOSPITAL | Age: 54
End: 2019-12-06

## 2020-02-12 ENCOUNTER — PATIENT OUTREACH (OUTPATIENT)
Dept: ADMINISTRATIVE | Facility: HOSPITAL | Age: 55
End: 2020-02-12

## 2020-04-06 ENCOUNTER — PATIENT MESSAGE (OUTPATIENT)
Dept: FAMILY MEDICINE | Facility: CLINIC | Age: 55
End: 2020-04-06

## 2020-04-06 RX ORDER — BUTALBITAL, ACETAMINOPHEN AND CAFFEINE 50; 325; 40 MG/1; MG/1; MG/1
1 TABLET ORAL EVERY 4 HOURS PRN
Qty: 30 TABLET | Refills: 0 | Status: SHIPPED | OUTPATIENT
Start: 2020-04-06 | End: 2020-07-22 | Stop reason: SDUPTHER

## 2020-04-18 ENCOUNTER — PATIENT MESSAGE (OUTPATIENT)
Dept: FAMILY MEDICINE | Facility: CLINIC | Age: 55
End: 2020-04-18

## 2020-05-04 ENCOUNTER — OFFICE VISIT (OUTPATIENT)
Dept: FAMILY MEDICINE | Facility: CLINIC | Age: 55
End: 2020-05-04
Payer: COMMERCIAL

## 2020-05-04 VITALS
SYSTOLIC BLOOD PRESSURE: 128 MMHG | OXYGEN SATURATION: 98 % | HEART RATE: 83 BPM | WEIGHT: 160.63 LBS | DIASTOLIC BLOOD PRESSURE: 72 MMHG | BODY MASS INDEX: 29.56 KG/M2 | HEIGHT: 62 IN | TEMPERATURE: 99 F

## 2020-05-04 DIAGNOSIS — F41.9 ANXIETY: ICD-10-CM

## 2020-05-04 DIAGNOSIS — K50.919 CROHN'S DISEASE WITH COMPLICATION, UNSPECIFIED GASTROINTESTINAL TRACT LOCATION: ICD-10-CM

## 2020-05-04 DIAGNOSIS — L71.9 ROSACEA: Primary | ICD-10-CM

## 2020-05-04 DIAGNOSIS — Z12.11 COLON CANCER SCREENING: ICD-10-CM

## 2020-05-04 PROCEDURE — 99999 PR PBB SHADOW E&M-EST. PATIENT-LVL III: ICD-10-PCS | Mod: PBBFAC,,, | Performed by: FAMILY MEDICINE

## 2020-05-04 PROCEDURE — 99214 OFFICE O/P EST MOD 30 MIN: CPT | Mod: S$GLB,,, | Performed by: FAMILY MEDICINE

## 2020-05-04 PROCEDURE — 99214 PR OFFICE/OUTPT VISIT, EST, LEVL IV, 30-39 MIN: ICD-10-PCS | Mod: S$GLB,,, | Performed by: FAMILY MEDICINE

## 2020-05-04 PROCEDURE — 99999 PR PBB SHADOW E&M-EST. PATIENT-LVL III: CPT | Mod: PBBFAC,,, | Performed by: FAMILY MEDICINE

## 2020-05-04 RX ORDER — DOXYCYCLINE HYCLATE 100 MG
100 TABLET ORAL EVERY 12 HOURS
Qty: 20 TABLET | Refills: 0 | Status: SHIPPED | OUTPATIENT
Start: 2020-05-04 | End: 2020-05-14

## 2020-05-04 RX ORDER — CLINDAMYCIN PHOSPHATE 10 MG/G
GEL TOPICAL 2 TIMES DAILY
Qty: 30 G | Refills: 1 | Status: SHIPPED | OUTPATIENT
Start: 2020-05-04 | End: 2021-08-12

## 2020-05-04 RX ORDER — CITALOPRAM 20 MG/1
20 TABLET, FILM COATED ORAL DAILY
Qty: 30 TABLET | Refills: 11 | Status: SHIPPED | OUTPATIENT
Start: 2020-05-04 | End: 2020-05-22
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
## 2020-05-05 ENCOUNTER — PATIENT MESSAGE (OUTPATIENT)
Dept: FAMILY MEDICINE | Facility: CLINIC | Age: 55
End: 2020-05-05

## 2020-05-22 ENCOUNTER — PATIENT MESSAGE (OUTPATIENT)
Dept: FAMILY MEDICINE | Facility: CLINIC | Age: 55
End: 2020-05-22

## 2020-05-22 ENCOUNTER — OFFICE VISIT (OUTPATIENT)
Dept: URGENT CARE | Facility: CLINIC | Age: 55
End: 2020-05-22
Payer: COMMERCIAL

## 2020-05-22 VITALS
SYSTOLIC BLOOD PRESSURE: 140 MMHG | WEIGHT: 161.06 LBS | HEART RATE: 87 BPM | DIASTOLIC BLOOD PRESSURE: 71 MMHG | TEMPERATURE: 98 F | OXYGEN SATURATION: 97 % | BODY MASS INDEX: 29.46 KG/M2

## 2020-05-22 DIAGNOSIS — R31.9 URINARY TRACT INFECTION WITH HEMATURIA, SITE UNSPECIFIED: Primary | ICD-10-CM

## 2020-05-22 DIAGNOSIS — N39.0 URINARY TRACT INFECTION WITH HEMATURIA, SITE UNSPECIFIED: Primary | ICD-10-CM

## 2020-05-22 LAB
BILIRUB SERPL-MCNC: NEGATIVE MG/DL
BLOOD URINE, POC: NORMAL
COLOR, POC UA: NORMAL
GLUCOSE UR QL STRIP: NORMAL
KETONES UR QL STRIP: NEGATIVE
LEUKOCYTE ESTERASE URINE, POC: NEGATIVE
NITRITE, POC UA: NEGATIVE
PH, POC UA: 5
PROTEIN, POC: NEGATIVE
SPECIFIC GRAVITY, POC UA: 1.01
UROBILINOGEN, POC UA: NEGATIVE

## 2020-05-22 PROCEDURE — 81002 URINALYSIS NONAUTO W/O SCOPE: CPT | Mod: S$GLB,,, | Performed by: NURSE PRACTITIONER

## 2020-05-22 PROCEDURE — 99214 PR OFFICE/OUTPT VISIT, EST, LEVL IV, 30-39 MIN: ICD-10-PCS | Mod: 25,S$GLB,, | Performed by: NURSE PRACTITIONER

## 2020-05-22 PROCEDURE — 99999 PR PBB SHADOW E&M-EST. PATIENT-LVL III: ICD-10-PCS | Mod: PBBFAC,,, | Performed by: NURSE PRACTITIONER

## 2020-05-22 PROCEDURE — 99214 OFFICE O/P EST MOD 30 MIN: CPT | Mod: 25,S$GLB,, | Performed by: NURSE PRACTITIONER

## 2020-05-22 PROCEDURE — 99999 PR PBB SHADOW E&M-EST. PATIENT-LVL III: CPT | Mod: PBBFAC,,, | Performed by: NURSE PRACTITIONER

## 2020-05-22 PROCEDURE — 87086 URINE CULTURE/COLONY COUNT: CPT

## 2020-05-22 PROCEDURE — 81002 POCT URINE DIPSTICK WITHOUT MICROSCOPE: ICD-10-PCS | Mod: S$GLB,,, | Performed by: NURSE PRACTITIONER

## 2020-05-22 RX ORDER — CIPROFLOXACIN 250 MG/1
250 TABLET, FILM COATED ORAL 2 TIMES DAILY
Qty: 6 TABLET | Refills: 0 | Status: SHIPPED | OUTPATIENT
Start: 2020-05-22 | End: 2020-05-25

## 2020-05-22 NOTE — PROGRESS NOTES
Subjective:       Patient ID: Kamilla Leavitt is a 55 y.o. female.    Chief Complaint: Urinary Tract Infection (pain, freq)    Urinary Tract Infection    This is a new problem. The current episode started today. The problem occurs every urination. The problem has been unchanged. The quality of the pain is described as burning. The pain is at a severity of 6/10. There has been no fever. She is sexually active. There is no history of pyelonephritis. Associated symptoms include urgency. Pertinent negatives include no behavior changes, chills, discharge, flank pain, frequency, nausea, vomiting, bubble bath use, constipation or rash. She has tried nothing for the symptoms.       BP (!) 140/71 (BP Location: Left arm, Patient Position: Sitting)   Pulse 87   Temp 97.8 °F (36.6 °C) (Tympanic)   Wt 73.1 kg (161 lb 0.7 oz)   SpO2 97%   BMI 29.46 kg/m²     Review of Systems   Constitutional: Negative for chills, diaphoresis and fever.   HENT: Negative for congestion, postnasal drip, sinus pressure and sore throat.    Respiratory: Negative for cough, chest tightness and shortness of breath.    Cardiovascular: Negative for chest pain and palpitations.   Gastrointestinal: Negative for abdominal distention, abdominal pain, constipation, diarrhea, nausea and vomiting.   Genitourinary: Positive for difficulty urinating, dysuria and urgency. Negative for flank pain and frequency.   Musculoskeletal: Negative for myalgias.   Skin: Negative for rash and wound.   Allergic/Immunologic: Negative for immunocompromised state.   Neurological: Negative for headaches.   Hematological: Does not bruise/bleed easily.   Psychiatric/Behavioral: Negative for behavioral problems and confusion.       Objective:      Physical Exam   Constitutional: She is oriented to person, place, and time. She appears well-developed and well-nourished.   HENT:   Head: Normocephalic and atraumatic.   Eyes: Conjunctivae are normal.   Neck: Normal range of motion.    Cardiovascular: Normal rate and regular rhythm.   Pulmonary/Chest: Effort normal. No respiratory distress.   Abdominal: Soft. Bowel sounds are normal. She exhibits no distension. There is no tenderness. There is no CVA tenderness.   Musculoskeletal: Normal range of motion.   Neurological: She is alert and oriented to person, place, and time.   Skin: Skin is warm and dry.   Psychiatric: She has a normal mood and affect. Her behavior is normal.   Nursing note and vitals reviewed.      Assessment:       1. Urinary tract infection with hematuria, site unspecified        Plan:       Kamilla was seen today for urinary tract infection.    Diagnoses and all orders for this visit:    Urinary tract infection with hematuria, site unspecified  -     ciprofloxacin HCl (CIPRO) 250 MG tablet; Take 1 tablet (250 mg total) by mouth 2 (two) times daily. for 3 days  -     Urine culture    If symptoms worsen or fail to improve, follow-up with primary care doctor or nearest ER. After visit summary given and discussed. Patient verbalized understanding and agrees with treatment plan. Patient remained stable and was discharged in no acute distress.   Patient Instructions     Urinary Tract Infections in Women    Urinary tract infections (UTIs) are most often caused by bacteria (germs). These bacteria enter the urinary tract. The bacteria may come from outside the body. Or they may travel from the skin outside the rectum or vagina into the urethra. Female anatomy makes it easy for bacteria from the bowel to enter a womans urinary tract, which is the most common source of UTI. This means women develop UTIs more often than men. Pain in or around the urinary tract is a common UTI symptom. But the only way to know for sure if you have a UTI for the healthcare provider to test your urine. The two tests that may be done are the urinalysis and urine culture.  Types of UTIs  · Cystitis: A bladder infection (cystitis) is the most common UTI in women.  You may have urgent or frequent urination. You may also have pain, burning when you urinate, and bloody urine.  · Urethritis: This is an inflamed urethra, which is the tube that carries urine from the bladder to outside the body. You may have lower stomach or back pain. You may also have urgent or frequent urination.  · Pyelonephritis: This is a kidney infection. If not treated, it can be serious and damage your kidneys. In severe cases, you may be hospitalized. You may have a fever and lower back pain.  Medicines to treat a UTI  Most UTIs are treated with antibiotics. These kill the bacteria. The length of time you need to take them depends on the type of infection. It may be as short as 3 days. If you have repeated UTIs, a low-dose antibiotic may be needed for several months. Take antibiotics exactly as directed. Dont stop taking them until all of the medicine is gone. If you stop taking the antibiotic too soon, the infection may not go away, and you may develop a resistance to the antibiotic. This can make it much harder to treat.  Lifestyle changes to treat and prevent UTIs  The lifestyle changes below will help get rid of your UTI. They may also help prevent future UTIs.  · Drink plenty of fluids. This includes water, juice, or other caffeine-free drinks. Fluids help flush bacteria out of your body.  · Empty your bladder. Always empty your bladder when you feel the urge to urinate. And always urinate before going to sleep. Urine that stays in your bladder can lead to infection. Try to urinate before and after sex as well.  · Practice good personal hygiene. Wipe yourself from front to back after using the toilet. This helps keep bacteria from getting into the urethra.  · Use condoms during sex. These help prevent UTIs caused by sexually transmitted bacteria. Also, avoid using spermicides during sex. These can increase the risk of UTIs. Choose other forms of birth control instead. For women who tend to get UTIs  after sex, a low-dose of a preventive antibiotic may be used. Be sure to discuss this option with your healthcare provider.  · Follow up with your healthcare provider as directed. He or she may test to make sure the infection has cleared. If needed, more treatment may be started.  Date Last Reviewed: 1/1/2017 © 2000-2017 [employer]. [address] [place] [address]. All rights reserved. This information is not intended as a substitute for professional medical care. Always follow your healthcare professional's instructions.        Urinary Tract Infections in Women    Urinary tract infections (UTIs) are most often caused by bacteria (germs). These bacteria enter the urinary tract. The bacteria may come from outside the body. Or they may travel from the skin outside the rectum or vagina into the urethra. Female anatomy makes it easy for bacteria from the bowel to enter a womans urinary tract, which is the most common source of UTI. This means women develop UTIs more often than men. Pain in or around the urinary tract is a common UTI symptom. But the only way to know for sure if you have a UTI for the healthcare provider to test your urine. The two tests that may be done are the urinalysis and urine culture.  Types of UTIs  · Cystitis: A bladder infection (cystitis) is the most common UTI in women. You may have urgent or frequent urination. You may also have pain, burning when you urinate, and bloody urine.  · Urethritis: This is an inflamed urethra, which is the tube that carries urine from the bladder to outside the body. You may have lower stomach or back pain. You may also have urgent or frequent urination.  · Pyelonephritis: This is a kidney infection. If not treated, it can be serious and damage your kidneys. In severe cases, you may be hospitalized. You may have a fever and lower back pain.  Medicines to treat a UTI  Most UTIs are treated with antibiotics. These kill the bacteria. The  length of time you need to take them depends on the type of infection. It may be as short as 3 days. If you have repeated UTIs, a low-dose antibiotic may be needed for several months. Take antibiotics exactly as directed. Dont stop taking them until all of the medicine is gone. If you stop taking the antibiotic too soon, the infection may not go away, and you may develop a resistance to the antibiotic. This can make it much harder to treat.  Lifestyle changes to treat and prevent UTIs  The lifestyle changes below will help get rid of your UTI. They may also help prevent future UTIs.  · Drink plenty of fluids. This includes water, juice, or other caffeine-free drinks. Fluids help flush bacteria out of your body.  · Empty your bladder. Always empty your bladder when you feel the urge to urinate. And always urinate before going to sleep. Urine that stays in your bladder can lead to infection. Try to urinate before and after sex as well.  · Practice good personal hygiene. Wipe yourself from front to back after using the toilet. This helps keep bacteria from getting into the urethra.  · Use condoms during sex. These help prevent UTIs caused by sexually transmitted bacteria. Also, avoid using spermicides during sex. These can increase the risk of UTIs. Choose other forms of birth control instead. For women who tend to get UTIs after sex, a low-dose of a preventive antibiotic may be used. Be sure to discuss this option with your healthcare provider.  · Follow up with your healthcare provider as directed. He or she may test to make sure the infection has cleared. If needed, more treatment may be started.  Date Last Reviewed: 1/1/2017  © 7866-5640 [employer]. [address] [place] [id number]. All rights reserved. This information is not intended as a substitute for professional medical care. Always follow your healthcare professional's instructions.

## 2020-05-22 NOTE — PATIENT INSTRUCTIONS

## 2020-05-23 ENCOUNTER — PATIENT MESSAGE (OUTPATIENT)
Dept: FAMILY MEDICINE | Facility: CLINIC | Age: 55
End: 2020-05-23

## 2020-05-24 LAB
BACTERIA UR CULT: NORMAL
BACTERIA UR CULT: NORMAL

## 2020-05-26 ENCOUNTER — TELEPHONE (OUTPATIENT)
Dept: EMERGENCY MEDICINE | Facility: HOSPITAL | Age: 55
End: 2020-05-26

## 2020-06-08 ENCOUNTER — TELEPHONE (OUTPATIENT)
Dept: ENDOSCOPY | Facility: HOSPITAL | Age: 55
End: 2020-06-08

## 2020-09-11 DIAGNOSIS — Z12.39 BREAST CANCER SCREENING: ICD-10-CM

## 2020-10-30 ENCOUNTER — TELEPHONE (OUTPATIENT)
Dept: ADMINISTRATIVE | Facility: HOSPITAL | Age: 55
End: 2020-10-30

## 2020-11-09 ENCOUNTER — PATIENT OUTREACH (OUTPATIENT)
Dept: ADMINISTRATIVE | Facility: HOSPITAL | Age: 55
End: 2020-11-09

## 2020-11-09 NOTE — PROGRESS NOTES
Working Mammogram Report       L/M for Pt to call office, Portal Message Sent   Grace HAYNES LPN Care Coordinator  Care Coordination Department  [facility]  [phone]

## 2020-11-19 ENCOUNTER — PATIENT MESSAGE (OUTPATIENT)
Dept: FAMILY MEDICINE | Facility: CLINIC | Age: 55
End: 2020-11-19

## 2020-11-30 ENCOUNTER — PATIENT MESSAGE (OUTPATIENT)
Dept: FAMILY MEDICINE | Facility: CLINIC | Age: 55
End: 2020-11-30

## 2020-11-30 RX ORDER — BUTALBITAL, ACETAMINOPHEN AND CAFFEINE 50; 325; 40 MG/1; MG/1; MG/1
1 TABLET ORAL EVERY 4 HOURS PRN
Qty: 30 TABLET | Refills: 1 | Status: SHIPPED | OUTPATIENT
Start: 2020-11-30 | End: 2021-03-17 | Stop reason: SDUPTHER

## 2020-12-01 ENCOUNTER — PATIENT MESSAGE (OUTPATIENT)
Dept: FAMILY MEDICINE | Facility: CLINIC | Age: 55
End: 2020-12-01

## 2020-12-31 ENCOUNTER — PATIENT OUTREACH (OUTPATIENT)
Dept: ADMINISTRATIVE | Facility: HOSPITAL | Age: 55
End: 2020-12-31

## 2020-12-31 NOTE — PROGRESS NOTES
Working colonoscopy report; Searched Care Everywhere, [employer]. I faxed colonoscopy request to Grady Mckeon MD.

## 2021-01-12 ENCOUNTER — TELEPHONE (OUTPATIENT)
Dept: ADMINISTRATIVE | Facility: HOSPITAL | Age: 56
End: 2021-01-12

## 2021-02-17 ENCOUNTER — PATIENT OUTREACH (OUTPATIENT)
Dept: ADMINISTRATIVE | Facility: HOSPITAL | Age: 56
End: 2021-02-17

## 2021-03-16 ENCOUNTER — PATIENT MESSAGE (OUTPATIENT)
Dept: FAMILY MEDICINE | Facility: CLINIC | Age: 56
End: 2021-03-16

## 2021-03-18 RX ORDER — BUTALBITAL, ACETAMINOPHEN AND CAFFEINE 50; 325; 40 MG/1; MG/1; MG/1
1 TABLET ORAL EVERY 4 HOURS PRN
Qty: 30 TABLET | Refills: 1 | Status: SHIPPED | OUTPATIENT
Start: 2021-03-18 | End: 2021-12-06 | Stop reason: SDUPTHER

## 2021-04-28 ENCOUNTER — HOSPITAL ENCOUNTER (OUTPATIENT)
Dept: RADIOLOGY | Facility: HOSPITAL | Age: 56
Discharge: HOME | End: 2021-04-28
Attending: FAMILY MEDICINE
Payer: COMMERCIAL

## 2021-04-28 DIAGNOSIS — Z12.39 BREAST CANCER SCREENING: ICD-10-CM

## 2021-05-06 ENCOUNTER — HOSPITAL ENCOUNTER (OUTPATIENT)
Dept: RADIOLOGY | Facility: HOSPITAL | Age: 56
Discharge: HOME | End: 2021-05-06
Attending: FAMILY MEDICINE
Payer: COMMERCIAL

## 2021-05-06 ENCOUNTER — PATIENT MESSAGE (OUTPATIENT)
Dept: FAMILY MEDICINE | Facility: CLINIC | Age: 56
End: 2021-05-06

## 2021-05-06 VITALS — WEIGHT: 161.19 LBS | HEIGHT: 62 IN | BODY MASS INDEX: 29.66 KG/M2

## 2021-05-06 DIAGNOSIS — Z12.39 BREAST CANCER SCREENING: ICD-10-CM

## 2021-05-06 PROCEDURE — 76642 ULTRASOUND BREAST LIMITED: CPT | Mod: TC,LT

## 2021-05-06 PROCEDURE — 77062 MAMMO DIGITAL DIAGNOSTIC BILAT WITH TOMO: ICD-10-PCS | Mod: 26,,, | Performed by: RADIOLOGY

## 2021-05-06 PROCEDURE — 77066 DX MAMMO INCL CAD BI: CPT | Mod: 26,,, | Performed by: RADIOLOGY

## 2021-05-06 PROCEDURE — 77062 BREAST TOMOSYNTHESIS BI: CPT | Mod: 26,,, | Performed by: RADIOLOGY

## 2021-05-06 PROCEDURE — 76642 US BREAST LEFT LIMITED: ICD-10-PCS | Mod: 26,LT,, | Performed by: RADIOLOGY

## 2021-05-06 PROCEDURE — 77066 MAMMO DIGITAL DIAGNOSTIC BILAT WITH TOMO: ICD-10-PCS | Mod: 26,,, | Performed by: RADIOLOGY

## 2021-05-06 PROCEDURE — 77062 BREAST TOMOSYNTHESIS BI: CPT | Mod: TC

## 2021-05-06 PROCEDURE — 76642 ULTRASOUND BREAST LIMITED: CPT | Mod: 26,LT,, | Performed by: RADIOLOGY

## 2021-05-19 ENCOUNTER — PATIENT MESSAGE (OUTPATIENT)
Dept: FAMILY MEDICINE | Facility: CLINIC | Age: 56
End: 2021-05-19

## 2021-05-20 ENCOUNTER — PATIENT MESSAGE (OUTPATIENT)
Dept: FAMILY MEDICINE | Facility: CLINIC | Age: 56
End: 2021-05-20

## 2021-05-20 ENCOUNTER — PATIENT OUTREACH (OUTPATIENT)
Dept: INTERNAL MEDICINE | Facility: CLINIC | Age: 56
End: 2021-05-20

## 2021-05-21 ENCOUNTER — IMMUNIZATION (OUTPATIENT)
Dept: INTERNAL MEDICINE | Facility: CLINIC | Age: 56
End: 2021-05-21
Payer: COMMERCIAL

## 2021-05-21 DIAGNOSIS — Z23 NEED FOR VACCINATION: Primary | ICD-10-CM

## 2021-05-21 PROCEDURE — 0001A COVID-19, MRNA, LNP-S, PF, 30 MCG/0.3 ML DOSE VACCINE: ICD-10-PCS | Mod: CV19,,, | Performed by: FAMILY MEDICINE

## 2021-05-21 PROCEDURE — 91300 COVID-19, MRNA, LNP-S, PF, 30 MCG/0.3 ML DOSE VACCINE: ICD-10-PCS | Mod: ,,, | Performed by: FAMILY MEDICINE

## 2021-05-21 PROCEDURE — 91300 COVID-19, MRNA, LNP-S, PF, 30 MCG/0.3 ML DOSE VACCINE: CPT | Mod: ,,, | Performed by: FAMILY MEDICINE

## 2021-05-21 PROCEDURE — 0001A COVID-19, MRNA, LNP-S, PF, 30 MCG/0.3 ML DOSE VACCINE: CPT | Mod: CV19,,, | Performed by: FAMILY MEDICINE

## 2021-06-11 ENCOUNTER — IMMUNIZATION (OUTPATIENT)
Dept: INTERNAL MEDICINE | Facility: CLINIC | Age: 56
End: 2021-06-11
Payer: COMMERCIAL

## 2021-06-11 DIAGNOSIS — Z23 NEED FOR VACCINATION: Primary | ICD-10-CM

## 2021-06-11 PROCEDURE — 0002A COVID-19, MRNA, LNP-S, PF, 30 MCG/0.3 ML DOSE VACCINE: CPT | Mod: PBBFAC

## 2021-06-11 PROCEDURE — 91300 COVID-19, MRNA, LNP-S, PF, 30 MCG/0.3 ML DOSE VACCINE: CPT | Mod: PBBFAC

## 2021-06-16 ENCOUNTER — PATIENT MESSAGE (OUTPATIENT)
Dept: FAMILY MEDICINE | Facility: CLINIC | Age: 56
End: 2021-06-16

## 2021-07-13 ENCOUNTER — PATIENT MESSAGE (OUTPATIENT)
Dept: ADMINISTRATIVE | Facility: HOSPITAL | Age: 56
End: 2021-07-13

## 2021-08-12 ENCOUNTER — OFFICE VISIT (OUTPATIENT)
Dept: FAMILY MEDICINE | Facility: CLINIC | Age: 56
End: 2021-08-12
Payer: COMMERCIAL

## 2021-08-12 ENCOUNTER — LAB VISIT (OUTPATIENT)
Dept: LAB | Facility: HOSPITAL | Age: 56
End: 2021-08-12
Attending: FAMILY MEDICINE
Payer: COMMERCIAL

## 2021-08-12 VITALS
DIASTOLIC BLOOD PRESSURE: 82 MMHG | WEIGHT: 159.19 LBS | OXYGEN SATURATION: 98 % | TEMPERATURE: 98 F | HEART RATE: 75 BPM | HEIGHT: 62 IN | BODY MASS INDEX: 29.3 KG/M2 | SYSTOLIC BLOOD PRESSURE: 134 MMHG

## 2021-08-12 DIAGNOSIS — G43.809 OTHER MIGRAINE WITHOUT STATUS MIGRAINOSUS, NOT INTRACTABLE: ICD-10-CM

## 2021-08-12 DIAGNOSIS — Z00.00 WELL ADULT EXAM: Primary | ICD-10-CM

## 2021-08-12 DIAGNOSIS — Z00.00 WELL ADULT EXAM: ICD-10-CM

## 2021-08-12 LAB
BASOPHILS # BLD AUTO: 0.04 K/UL (ref 0–0.2)
BASOPHILS NFR BLD: 0.6 % (ref 0–1.9)
DIFFERENTIAL METHOD: NORMAL
EOSINOPHIL # BLD AUTO: 0.2 K/UL (ref 0–0.5)
EOSINOPHIL NFR BLD: 2.6 % (ref 0–8)
ERYTHROCYTE [DISTWIDTH] IN BLOOD BY AUTOMATED COUNT: 12.6 % (ref 11.5–14.5)
ESTIMATED AVG GLUCOSE: 117 MG/DL (ref 68–131)
HBA1C MFR BLD: 5.7 % (ref 4–5.6)
HCT VFR BLD AUTO: 40.1 % (ref 37–48.5)
HGB BLD-MCNC: 12.9 G/DL (ref 12–16)
IMM GRANULOCYTES # BLD AUTO: 0.02 K/UL (ref 0–0.04)
IMM GRANULOCYTES NFR BLD AUTO: 0.3 % (ref 0–0.5)
LYMPHOCYTES # BLD AUTO: 2.2 K/UL (ref 1–4.8)
LYMPHOCYTES NFR BLD: 33.5 % (ref 18–48)
MCH RBC QN AUTO: 27.4 PG (ref 27–31)
MCHC RBC AUTO-ENTMCNC: 32.2 G/DL (ref 32–36)
MCV RBC AUTO: 85 FL (ref 82–98)
MONOCYTES # BLD AUTO: 0.4 K/UL (ref 0.3–1)
MONOCYTES NFR BLD: 6.4 % (ref 4–15)
NEUTROPHILS # BLD AUTO: 3.7 K/UL (ref 1.8–7.7)
NEUTROPHILS NFR BLD: 56.6 % (ref 38–73)
NRBC BLD-RTO: 0 /100 WBC
PLATELET # BLD AUTO: 226 K/UL (ref 150–450)
PMV BLD AUTO: 11.8 FL (ref 9.2–12.9)
RBC # BLD AUTO: 4.7 M/UL (ref 4–5.4)
WBC # BLD AUTO: 6.54 K/UL (ref 3.9–12.7)

## 2021-08-12 PROCEDURE — 99999 PR PBB SHADOW E&M-EST. PATIENT-LVL III: CPT | Mod: PBBFAC,,, | Performed by: FAMILY MEDICINE

## 2021-08-12 PROCEDURE — 36415 COLL VENOUS BLD VENIPUNCTURE: CPT | Mod: PO | Performed by: FAMILY MEDICINE

## 2021-08-12 PROCEDURE — 90471 ZOSTER RECOMBINANT VACCINE: ICD-10-PCS | Mod: S$GLB,,, | Performed by: FAMILY MEDICINE

## 2021-08-12 PROCEDURE — 85025 COMPLETE CBC W/AUTO DIFF WBC: CPT | Performed by: FAMILY MEDICINE

## 2021-08-12 PROCEDURE — 99396 PREV VISIT EST AGE 40-64: CPT | Mod: 25,S$GLB,, | Performed by: FAMILY MEDICINE

## 2021-08-12 PROCEDURE — 90471 IMMUNIZATION ADMIN: CPT | Mod: S$GLB,,, | Performed by: FAMILY MEDICINE

## 2021-08-12 PROCEDURE — 99396 PR PREVENTIVE VISIT,EST,40-64: ICD-10-PCS | Mod: 25,S$GLB,, | Performed by: FAMILY MEDICINE

## 2021-08-12 PROCEDURE — 83036 HEMOGLOBIN GLYCOSYLATED A1C: CPT | Performed by: FAMILY MEDICINE

## 2021-08-12 PROCEDURE — 80061 LIPID PANEL: CPT | Performed by: FAMILY MEDICINE

## 2021-08-12 PROCEDURE — 90750 HZV VACC RECOMBINANT IM: CPT | Mod: S$GLB,,, | Performed by: FAMILY MEDICINE

## 2021-08-12 PROCEDURE — 86803 HEPATITIS C AB TEST: CPT | Performed by: FAMILY MEDICINE

## 2021-08-12 PROCEDURE — 87389 HIV-1 AG W/HIV-1&-2 AB AG IA: CPT | Performed by: FAMILY MEDICINE

## 2021-08-12 PROCEDURE — 90750 ZOSTER RECOMBINANT VACCINE: ICD-10-PCS | Mod: S$GLB,,, | Performed by: FAMILY MEDICINE

## 2021-08-12 PROCEDURE — 99999 PR PBB SHADOW E&M-EST. PATIENT-LVL III: ICD-10-PCS | Mod: PBBFAC,,, | Performed by: FAMILY MEDICINE

## 2021-08-12 PROCEDURE — 80053 COMPREHEN METABOLIC PANEL: CPT | Performed by: FAMILY MEDICINE

## 2021-08-13 LAB
ALBUMIN SERPL BCP-MCNC: 4 G/DL (ref 3.5–5.2)
ALP SERPL-CCNC: 71 U/L (ref 55–135)
ALT SERPL W/O P-5'-P-CCNC: 13 U/L (ref 10–44)
ANION GAP SERPL CALC-SCNC: 11 MMOL/L (ref 8–16)
AST SERPL-CCNC: 14 U/L (ref 10–40)
BILIRUB SERPL-MCNC: 0.3 MG/DL (ref 0.1–1)
BUN SERPL-MCNC: 15 MG/DL (ref 6–20)
CALCIUM SERPL-MCNC: 10.1 MG/DL (ref 8.7–10.5)
CHLORIDE SERPL-SCNC: 104 MMOL/L (ref 95–110)
CHOLEST SERPL-MCNC: 201 MG/DL (ref 120–199)
CHOLEST/HDLC SERPL: 3.4 {RATIO} (ref 2–5)
CO2 SERPL-SCNC: 29 MMOL/L (ref 23–29)
CREAT SERPL-MCNC: 0.7 MG/DL (ref 0.5–1.4)
EST. GFR  (AFRICAN AMERICAN): >60 ML/MIN/1.73 M^2
EST. GFR  (NON AFRICAN AMERICAN): >60 ML/MIN/1.73 M^2
GLUCOSE SERPL-MCNC: 82 MG/DL (ref 70–110)
HCV AB SERPL QL IA: NEGATIVE
HDLC SERPL-MCNC: 60 MG/DL (ref 40–75)
HDLC SERPL: 29.9 % (ref 20–50)
HIV 1+2 AB+HIV1 P24 AG SERPL QL IA: NEGATIVE
LDLC SERPL CALC-MCNC: 120.6 MG/DL (ref 63–159)
NONHDLC SERPL-MCNC: 141 MG/DL
POTASSIUM SERPL-SCNC: 4 MMOL/L (ref 3.5–5.1)
PROT SERPL-MCNC: 7.2 G/DL (ref 6–8.4)
SODIUM SERPL-SCNC: 144 MMOL/L (ref 136–145)
TRIGL SERPL-MCNC: 102 MG/DL (ref 30–150)

## 2021-08-16 ENCOUNTER — PATIENT MESSAGE (OUTPATIENT)
Dept: FAMILY MEDICINE | Facility: CLINIC | Age: 56
End: 2021-08-16

## 2021-10-21 ENCOUNTER — CLINICAL SUPPORT (OUTPATIENT)
Dept: FAMILY MEDICINE | Facility: CLINIC | Age: 56
End: 2021-10-21
Payer: COMMERCIAL

## 2021-10-21 DIAGNOSIS — Z23 NEED FOR ZOSTER VACCINATION: Primary | ICD-10-CM

## 2021-10-21 PROCEDURE — 90471 IMMUNIZATION ADMIN: CPT | Mod: S$GLB,,, | Performed by: FAMILY MEDICINE

## 2021-10-21 PROCEDURE — 90750 ZOSTER RECOMBINANT VACCINE: ICD-10-PCS | Mod: S$GLB,,, | Performed by: FAMILY MEDICINE

## 2021-10-21 PROCEDURE — 90750 HZV VACC RECOMBINANT IM: CPT | Mod: S$GLB,,, | Performed by: FAMILY MEDICINE

## 2021-10-21 PROCEDURE — 90471 ZOSTER RECOMBINANT VACCINE: ICD-10-PCS | Mod: S$GLB,,, | Performed by: FAMILY MEDICINE

## 2021-12-06 ENCOUNTER — PATIENT MESSAGE (OUTPATIENT)
Dept: FAMILY MEDICINE | Facility: CLINIC | Age: 56
End: 2021-12-06
Payer: COMMERCIAL

## 2021-12-06 DIAGNOSIS — G43.809 OTHER MIGRAINE WITHOUT STATUS MIGRAINOSUS, NOT INTRACTABLE: Primary | ICD-10-CM

## 2021-12-06 RX ORDER — BUTALBITAL, ACETAMINOPHEN AND CAFFEINE 50; 325; 40 MG/1; MG/1; MG/1
1 TABLET ORAL EVERY 4 HOURS PRN
Qty: 30 TABLET | Refills: 1 | Status: SHIPPED | OUTPATIENT
Start: 2021-12-06 | End: 2022-03-11 | Stop reason: SDUPTHER

## 2022-01-02 ENCOUNTER — PATIENT MESSAGE (OUTPATIENT)
Dept: FAMILY MEDICINE | Facility: CLINIC | Age: 57
End: 2022-01-02
Payer: COMMERCIAL

## 2022-02-11 ENCOUNTER — PATIENT MESSAGE (OUTPATIENT)
Dept: FAMILY MEDICINE | Facility: CLINIC | Age: 57
End: 2022-02-11
Payer: COMMERCIAL

## 2022-02-11 ENCOUNTER — OFFICE VISIT (OUTPATIENT)
Dept: FAMILY MEDICINE | Facility: CLINIC | Age: 57
End: 2022-02-11
Payer: COMMERCIAL

## 2022-02-11 VITALS
HEIGHT: 62 IN | HEART RATE: 82 BPM | TEMPERATURE: 98 F | BODY MASS INDEX: 29.21 KG/M2 | WEIGHT: 158.75 LBS | SYSTOLIC BLOOD PRESSURE: 132 MMHG | DIASTOLIC BLOOD PRESSURE: 88 MMHG | OXYGEN SATURATION: 99 %

## 2022-02-11 DIAGNOSIS — G43.809 OTHER MIGRAINE WITHOUT STATUS MIGRAINOSUS, NOT INTRACTABLE: Primary | ICD-10-CM

## 2022-02-11 DIAGNOSIS — R73.03 PREDIABETES: ICD-10-CM

## 2022-02-11 LAB
CTP QC/QA: YES
SARS-COV-2 RDRP RESP QL NAA+PROBE: NEGATIVE

## 2022-02-11 PROCEDURE — U0002: ICD-10-PCS | Mod: QW,S$GLB,, | Performed by: FAMILY MEDICINE

## 2022-02-11 PROCEDURE — 99999 PR PBB SHADOW E&M-EST. PATIENT-LVL IV: ICD-10-PCS | Mod: PBBFAC,,, | Performed by: FAMILY MEDICINE

## 2022-02-11 PROCEDURE — U0002 COVID-19 LAB TEST NON-CDC: HCPCS | Mod: QW,S$GLB,, | Performed by: FAMILY MEDICINE

## 2022-02-11 PROCEDURE — 99214 PR OFFICE/OUTPT VISIT, EST, LEVL IV, 30-39 MIN: ICD-10-PCS | Mod: 25,S$GLB,, | Performed by: FAMILY MEDICINE

## 2022-02-11 PROCEDURE — 99214 OFFICE O/P EST MOD 30 MIN: CPT | Mod: 25,S$GLB,, | Performed by: FAMILY MEDICINE

## 2022-02-11 PROCEDURE — 99999 PR PBB SHADOW E&M-EST. PATIENT-LVL IV: CPT | Mod: PBBFAC,,, | Performed by: FAMILY MEDICINE

## 2022-02-11 PROCEDURE — 96372 THER/PROPH/DIAG INJ SC/IM: CPT | Mod: S$GLB,,, | Performed by: FAMILY MEDICINE

## 2022-02-11 PROCEDURE — 96372 PR INJECTION,THERAP/PROPH/DIAG2ST, IM OR SUBCUT: ICD-10-PCS | Mod: S$GLB,,, | Performed by: FAMILY MEDICINE

## 2022-02-11 RX ORDER — KETOROLAC TROMETHAMINE 15 MG/ML
30 INJECTION, SOLUTION INTRAMUSCULAR; INTRAVENOUS ONCE
Status: DISCONTINUED | OUTPATIENT
Start: 2022-02-11 | End: 2022-02-11

## 2022-02-11 RX ORDER — TIZANIDINE 4 MG/1
4 TABLET ORAL EVERY 6 HOURS PRN
Qty: 12 TABLET | Refills: 0 | Status: SHIPPED | OUTPATIENT
Start: 2022-02-11 | End: 2022-02-21

## 2022-02-11 RX ORDER — KETOROLAC TROMETHAMINE 30 MG/ML
30 INJECTION, SOLUTION INTRAMUSCULAR; INTRAVENOUS
Status: COMPLETED | OUTPATIENT
Start: 2022-02-11 | End: 2022-02-11

## 2022-02-11 RX ADMIN — KETOROLAC TROMETHAMINE 30 MG: 30 INJECTION, SOLUTION INTRAMUSCULAR; INTRAVENOUS at 11:02

## 2022-02-11 NOTE — PROGRESS NOTES
Subjective:       Patient ID: Kamilla Leavitt is a 56 y.o. female.    Chief Complaint: head pressure      HPI Comments:       Current Outpatient Medications:     butalbital-acetaminophen-caffeine -40 mg (FIORICET, ESGIC) -40 mg per tablet, Take 1 tablet by mouth every 4 (four) hours as needed for Pain., Disp: 30 tablet, Rfl: 1    tiZANidine (ZANAFLEX) 4 MG tablet, Take 1 tablet (4 mg total) by mouth every 6 (six) hours as needed., Disp: 12 tablet, Rfl: 0  No current facility-administered medications for this visit.          This my 1st time seeing this patient.  She is here to establish care/change PCP.  Accompanied by her .  She has a long-term history of migraines.    Woke up this morning with a severe migraine.  8/10.  Similar to past migraines but more severe.  My head feels like it is on fire.  Back of the head mostly but also in the front.  Some nausea but no vomiting.  Took a Fioricet without much relief.  Usually these work.  Has been having headaches at least monthly for a long time.  Used to see [facility] doctor who had her on Fioricet and Topamax.  The latter did not help much.  She has been tried on it subsequently, but currently only uses Fioricet when she has the headaches.    Patient was unaware that she is prediabetic.  Will arrange follow-up to address this.      Review of Systems   Constitutional: Negative for activity change, appetite change and fever.   HENT: Negative for sore throat.    Respiratory: Negative for cough and shortness of breath.    Cardiovascular: Negative for chest pain.   Gastrointestinal: Positive for nausea. Negative for abdominal pain and diarrhea.   Genitourinary: Negative for difficulty urinating.   Musculoskeletal: Positive for neck pain. Negative for arthralgias and myalgias.   Neurological: Positive for headaches. Negative for dizziness.       Objective:      Vitals:    02/11/22 1119   BP: 132/88   Pulse: 82   Temp: 98.2 °F (36.8 °C)  "  TempSrc: Temporal   SpO2: 99%   Weight: 72 kg (158 lb 11.7 oz)   Height: 5' 2" (1.575 m)   PainSc:   8   PainLoc: Head     Physical Exam  Vitals and nursing note reviewed.   Constitutional:       General: She is not in acute distress.     Appearance: She is well-developed and well-nourished. She is ill-appearing. She is not diaphoretic.   HENT:      Head: Normocephalic.      Mouth/Throat:      Pharynx: No oropharyngeal exudate.   Eyes:      General: Lids are normal.      Extraocular Movements:      Right eye: Normal extraocular motion.      Left eye: Normal extraocular motion.      Pupils: Pupils are equal, round, and reactive to light.   Neck:      Thyroid: No thyromegaly.   Cardiovascular:      Rate and Rhythm: Normal rate and regular rhythm.      Heart sounds: Normal heart sounds. No murmur heard.      Pulmonary:      Effort: Pulmonary effort is normal.      Breath sounds: Normal breath sounds. No wheezing or rales.   Abdominal:      General: There is no distension.      Palpations: Abdomen is soft. There is no hepatomegaly, splenomegaly or mass.      Tenderness: There is no abdominal tenderness.   Musculoskeletal:         General: No edema.      Cervical back: Neck supple. No rigidity. Pain with movement and muscular tenderness present. No spinous process tenderness. Decreased range of motion.   Lymphadenopathy:      Cervical: No cervical adenopathy.   Skin:     General: Skin is warm and dry.   Neurological:      Mental Status: She is alert and oriented to person, place, and time.      Cranial Nerves: Cranial nerves are intact.      Motor: No weakness.      Coordination: Coordination is intact.   Psychiatric:         Mood and Affect: Mood and affect normal.         Behavior: Behavior normal.         Thought Content: Thought content normal.         Judgment: Judgment normal.         Assessment:       1. Other migraine without status migrainosus, not intractable    2. Prediabetes        Plan:   Other migraine " without status migrainosus, not intractable  Comments:  COVID negative.  Toradol IM.  Tizanidine as needed for neck pain.  Follow-up [facility] for chronic migraines  Orders:  -     COVID-19 Routine Screening; Future; Expected date: 02/11/2022  -     Discontinue: ketorolac injection 30 mg  -     Ambulatory referral/consult to Neurology; Future; Expected date: 02/18/2022  -     Ambulatory referral/consult to Neurology; Future; Expected date: 02/18/2022  -     Discontinue: ketorolac injection 30 mg  -     POCT COVID-19 Rapid Screening  -     ketorolac injection 30 mg    Prediabetes  Comments:  Follow-up 1 month    Other orders  -     tiZANidine (ZANAFLEX) 4 MG tablet; Take 1 tablet (4 mg total) by mouth every 6 (six) hours as needed.  Dispense: 12 tablet; Refill: 0

## 2022-03-11 ENCOUNTER — OFFICE VISIT (OUTPATIENT)
Dept: FAMILY MEDICINE | Facility: CLINIC | Age: 57
End: 2022-03-11
Payer: COMMERCIAL

## 2022-03-11 ENCOUNTER — LAB VISIT (OUTPATIENT)
Dept: LAB | Facility: HOSPITAL | Age: 57
End: 2022-03-11
Attending: FAMILY MEDICINE
Payer: COMMERCIAL

## 2022-03-11 ENCOUNTER — PATIENT MESSAGE (OUTPATIENT)
Dept: FAMILY MEDICINE | Facility: CLINIC | Age: 57
End: 2022-03-11

## 2022-03-11 VITALS
HEART RATE: 78 BPM | SYSTOLIC BLOOD PRESSURE: 136 MMHG | WEIGHT: 159.31 LBS | DIASTOLIC BLOOD PRESSURE: 79 MMHG | OXYGEN SATURATION: 99 % | BODY MASS INDEX: 29.32 KG/M2 | TEMPERATURE: 97 F | RESPIRATION RATE: 16 BRPM | HEIGHT: 62 IN

## 2022-03-11 DIAGNOSIS — R73.03 PREDIABETES: Primary | ICD-10-CM

## 2022-03-11 DIAGNOSIS — G43.809 OTHER MIGRAINE WITHOUT STATUS MIGRAINOSUS, NOT INTRACTABLE: ICD-10-CM

## 2022-03-11 DIAGNOSIS — R73.03 PREDIABETES: ICD-10-CM

## 2022-03-11 DIAGNOSIS — Z12.31 ENCOUNTER FOR SCREENING MAMMOGRAM FOR MALIGNANT NEOPLASM OF BREAST: ICD-10-CM

## 2022-03-11 DIAGNOSIS — K50.919 CROHN'S DISEASE WITH COMPLICATION, UNSPECIFIED GASTROINTESTINAL TRACT LOCATION: ICD-10-CM

## 2022-03-11 LAB
ESTIMATED AVG GLUCOSE: 120 MG/DL (ref 68–131)
HBA1C MFR BLD: 5.8 % (ref 4–5.6)

## 2022-03-11 PROCEDURE — 36415 COLL VENOUS BLD VENIPUNCTURE: CPT | Mod: PO | Performed by: FAMILY MEDICINE

## 2022-03-11 PROCEDURE — 99999 PR PBB SHADOW E&M-EST. PATIENT-LVL IV: ICD-10-PCS | Mod: PBBFAC,,, | Performed by: FAMILY MEDICINE

## 2022-03-11 PROCEDURE — 99214 PR OFFICE/OUTPT VISIT, EST, LEVL IV, 30-39 MIN: ICD-10-PCS | Mod: S$GLB,,, | Performed by: FAMILY MEDICINE

## 2022-03-11 PROCEDURE — 99214 OFFICE O/P EST MOD 30 MIN: CPT | Mod: S$GLB,,, | Performed by: FAMILY MEDICINE

## 2022-03-11 PROCEDURE — 83036 HEMOGLOBIN GLYCOSYLATED A1C: CPT | Performed by: FAMILY MEDICINE

## 2022-03-11 PROCEDURE — 99999 PR PBB SHADOW E&M-EST. PATIENT-LVL IV: CPT | Mod: PBBFAC,,, | Performed by: FAMILY MEDICINE

## 2022-03-11 RX ORDER — BUTALBITAL, ACETAMINOPHEN AND CAFFEINE 50; 325; 40 MG/1; MG/1; MG/1
1 TABLET ORAL EVERY 4 HOURS PRN
Qty: 30 TABLET | Refills: 0 | Status: SHIPPED | OUTPATIENT
Start: 2022-03-11 | End: 2022-12-12 | Stop reason: SDUPTHER

## 2022-03-11 NOTE — PROGRESS NOTES
"Subjective:       Patient ID: Kamilla Leavitt is a 56 y.o. female.    Chief Complaint: Follow-up      HPI Comments:       Current Outpatient Medications:     butalbital-acetaminophen-caffeine -40 mg (FIORICET, ESGIC) -40 mg per tablet, Take 1 tablet by mouth every 4 (four) hours as needed for Pain., Disp: 30 tablet, Rfl: 0      Recently established care with me.  One-month follow-up to talk about prediabetes.    Still having migraines.  She has [facility] doctor next week.  Would like a refill on Fioricet which helps.  Not having headaches as often as she was when she last saw me.    Long-term history of Crohn's disease.  Has not had any problems with for a long time.  Is overdue for colonoscopy.  Wants to see GI discussed.    Has started changing her diet by cutting back on sweets and carbohydrates.  Would like to lose more weight but has not done so.  Wary about using meds for prediabetes because of her Crohn's disease    Review of Systems   Constitutional: Negative for activity change, appetite change and fever.   HENT: Negative for sore throat.    Respiratory: Negative for cough and shortness of breath.    Cardiovascular: Negative for chest pain.   Gastrointestinal: Negative for abdominal pain, diarrhea and nausea.   Genitourinary: Negative for difficulty urinating.   Musculoskeletal: Negative for arthralgias and myalgias.   Neurological: Positive for headaches. Negative for dizziness.       Objective:      Vitals:    03/11/22 1058   BP: 136/79   Pulse: 78   Resp: 16   Temp: 97.4 °F (36.3 °C)   TempSrc: Temporal   SpO2: 99%   Weight: 72.3 kg (159 lb 4.5 oz)   Height: 5' 2" (1.575 m)   PainSc: 0-No pain     Physical Exam  Vitals and nursing note reviewed.   Constitutional:       General: She is not in acute distress.     Appearance: She is well-developed. She is not diaphoretic.   HENT:      Head: Normocephalic.   Neck:      Thyroid: No thyromegaly.   Cardiovascular:      Rate and Rhythm: Normal " rate and regular rhythm.      Heart sounds: Normal heart sounds. No murmur heard.  Pulmonary:      Effort: Pulmonary effort is normal.      Breath sounds: Normal breath sounds. No wheezing or rales.   Abdominal:      General: There is no distension.      Palpations: Abdomen is soft.   Musculoskeletal:      Cervical back: Neck supple.   Lymphadenopathy:      Cervical: No cervical adenopathy.   Skin:     General: Skin is warm and dry.   Neurological:      Mental Status: She is alert and oriented to person, place, and time.   Psychiatric:         Mood and Affect: Mood normal.         Behavior: Behavior normal.         Thought Content: Thought content normal.         Judgment: Judgment normal.         Assessment:       1. Prediabetes    2. Other migraine without status migrainosus, not intractable    3. Crohn's disease with complication, unspecified gastrointestinal tract location    4. Encounter for screening mammogram for malignant neoplasm of breast        Plan:   Prediabetes  Comments:  A1c today.  If stable will continue working with diet and weight loss.  Follow-up 6 months  Orders:  -     Hemoglobin A1C; Future; Expected date: 03/11/2022    Other migraine without status migrainosus, not intractable  Comments:  Refill Fioricet.  She has Neurology next week for management moving forward  Orders:  -     butalbital-acetaminophen-caffeine -40 mg (FIORICET, ESGIC) -40 mg per tablet; Take 1 tablet by mouth every 4 (four) hours as needed for Pain.  Dispense: 30 tablet; Refill: 0    Crohn's disease with complication, unspecified gastrointestinal tract location  Comments:  GI consult to discuss management, colonoscopy  Orders:  -     Ambulatory referral/consult to Gastroenterology; Future; Expected date: 03/18/2022    Encounter for screening mammogram for malignant neoplasm of breast  Comments:  Mammogram ordered  Orders:  -     Mammo Digital Screening Bilat w/ Jeb; Future; Expected date: 03/11/2022

## 2022-06-09 ENCOUNTER — PATIENT MESSAGE (OUTPATIENT)
Dept: FAMILY MEDICINE | Facility: CLINIC | Age: 57
End: 2022-06-09
Payer: COMMERCIAL

## 2022-07-04 ENCOUNTER — PATIENT MESSAGE (OUTPATIENT)
Dept: FAMILY MEDICINE | Facility: CLINIC | Age: 57
End: 2022-07-04
Payer: COMMERCIAL

## 2022-08-05 ENCOUNTER — LAB VISIT (OUTPATIENT)
Dept: LAB | Facility: HOSPITAL | Age: 57
End: 2022-08-05
Attending: FAMILY MEDICINE
Payer: COMMERCIAL

## 2022-08-05 ENCOUNTER — OFFICE VISIT (OUTPATIENT)
Dept: FAMILY MEDICINE | Facility: CLINIC | Age: 57
End: 2022-08-05
Payer: COMMERCIAL

## 2022-08-05 VITALS
HEIGHT: 62 IN | DIASTOLIC BLOOD PRESSURE: 82 MMHG | WEIGHT: 161.19 LBS | OXYGEN SATURATION: 98 % | HEART RATE: 84 BPM | BODY MASS INDEX: 29.66 KG/M2 | TEMPERATURE: 98 F | SYSTOLIC BLOOD PRESSURE: 134 MMHG

## 2022-08-05 DIAGNOSIS — G43.809 OTHER MIGRAINE WITHOUT STATUS MIGRAINOSUS, NOT INTRACTABLE: ICD-10-CM

## 2022-08-05 DIAGNOSIS — R73.03 PREDIABETES: Primary | ICD-10-CM

## 2022-08-05 DIAGNOSIS — M79.10 MYALGIA: ICD-10-CM

## 2022-08-05 DIAGNOSIS — R73.03 PREDIABETES: ICD-10-CM

## 2022-08-05 DIAGNOSIS — K50.919 CROHN'S DISEASE WITH COMPLICATION, UNSPECIFIED GASTROINTESTINAL TRACT LOCATION: ICD-10-CM

## 2022-08-05 DIAGNOSIS — Z12.31 ENCOUNTER FOR SCREENING MAMMOGRAM FOR MALIGNANT NEOPLASM OF BREAST: ICD-10-CM

## 2022-08-05 LAB
ALBUMIN SERPL BCP-MCNC: 3.9 G/DL (ref 3.5–5.2)
ALP SERPL-CCNC: 62 U/L (ref 55–135)
ALT SERPL W/O P-5'-P-CCNC: 18 U/L (ref 10–44)
ANION GAP SERPL CALC-SCNC: 8 MMOL/L (ref 8–16)
AST SERPL-CCNC: 15 U/L (ref 10–40)
BILIRUB SERPL-MCNC: 0.4 MG/DL (ref 0.1–1)
BUN SERPL-MCNC: 11 MG/DL (ref 6–20)
CALCIUM SERPL-MCNC: 9.9 MG/DL (ref 8.7–10.5)
CHLORIDE SERPL-SCNC: 106 MMOL/L (ref 95–110)
CO2 SERPL-SCNC: 27 MMOL/L (ref 23–29)
CREAT SERPL-MCNC: 0.7 MG/DL (ref 0.5–1.4)
EST. GFR  (NO RACE VARIABLE): >60 ML/MIN/1.73 M^2
ESTIMATED AVG GLUCOSE: 114 MG/DL (ref 68–131)
GLUCOSE SERPL-MCNC: 101 MG/DL (ref 70–110)
HBA1C MFR BLD: 5.6 % (ref 4–5.6)
POTASSIUM SERPL-SCNC: 4 MMOL/L (ref 3.5–5.1)
PROT SERPL-MCNC: 6.9 G/DL (ref 6–8.4)
SODIUM SERPL-SCNC: 141 MMOL/L (ref 136–145)
TSH SERPL DL<=0.005 MIU/L-ACNC: 1.54 UIU/ML (ref 0.4–4)

## 2022-08-05 PROCEDURE — 80053 COMPREHEN METABOLIC PANEL: CPT | Performed by: FAMILY MEDICINE

## 2022-08-05 PROCEDURE — 84443 ASSAY THYROID STIM HORMONE: CPT | Performed by: FAMILY MEDICINE

## 2022-08-05 PROCEDURE — 99214 OFFICE O/P EST MOD 30 MIN: CPT | Mod: S$GLB,,, | Performed by: FAMILY MEDICINE

## 2022-08-05 PROCEDURE — 99999 PR PBB SHADOW E&M-EST. PATIENT-LVL III: ICD-10-PCS | Mod: PBBFAC,,, | Performed by: FAMILY MEDICINE

## 2022-08-05 PROCEDURE — 99214 PR OFFICE/OUTPT VISIT, EST, LEVL IV, 30-39 MIN: ICD-10-PCS | Mod: S$GLB,,, | Performed by: FAMILY MEDICINE

## 2022-08-05 PROCEDURE — 36415 COLL VENOUS BLD VENIPUNCTURE: CPT | Mod: PO | Performed by: FAMILY MEDICINE

## 2022-08-05 PROCEDURE — 83036 HEMOGLOBIN GLYCOSYLATED A1C: CPT | Performed by: FAMILY MEDICINE

## 2022-08-05 PROCEDURE — 99999 PR PBB SHADOW E&M-EST. PATIENT-LVL III: CPT | Mod: PBBFAC,,, | Performed by: FAMILY MEDICINE

## 2022-08-05 NOTE — PROGRESS NOTES
"Subjective:       Patient ID: Kamilla Leavitt is a 57 y.o. female.    Chief Complaint: Follow-up      HPI Comments:       Current Outpatient Medications:     butalbital-acetaminophen-caffeine -40 mg (FIORICET, ESGIC) -40 mg per tablet, Take 1 tablet by mouth every 4 (four) hours as needed for Pain. (Patient not taking: Reported on 8/5/2022), Disp: 30 tablet, Rfl: 0      Here for 5 month follow-up.    Prediabetes    Saw [facility] for headaches.  Continue Fioricet, muscle relaxants.  Says related to long COVID.  Now headaches or infrequent.    Crohn's disease is stable.    Mammogram scheduled later this month.    Complains of muscle aches for the last month.  Arms and back, leg cramps.  Off and on.    Wants report that her mother has else eye MERS disease.  Patient has no memory concerns    Review of Systems   Constitutional: Positive for unexpected weight change. Negative for activity change.   HENT: Negative for hearing loss, rhinorrhea and trouble swallowing.    Eyes: Negative for discharge and visual disturbance.   Respiratory: Negative for chest tightness and wheezing.    Cardiovascular: Negative for chest pain and palpitations.   Gastrointestinal: Negative for blood in stool, constipation, diarrhea and vomiting.   Endocrine: Negative for polydipsia and polyuria.   Genitourinary: Negative for difficulty urinating, dysuria, hematuria and menstrual problem.   Musculoskeletal: Positive for myalgias and neck pain. Negative for arthralgias and joint swelling.   Neurological: Positive for headaches. Negative for weakness.   Psychiatric/Behavioral: Negative for confusion and dysphoric mood.       Objective:      Vitals:    08/05/22 0808   BP: 134/82   Pulse: 84   Temp: 97.6 °F (36.4 °C)   SpO2: 98%   Weight: 73.1 kg (161 lb 2.5 oz)   Height: 5' 2" (1.575 m)   PainSc:   5     Physical Exam  Vitals and nursing note reviewed.   Constitutional:       General: She is not in acute distress.     Appearance: " She is well-developed. She is not diaphoretic.   HENT:      Head: Normocephalic.   Neck:      Thyroid: No thyromegaly.   Cardiovascular:      Rate and Rhythm: Normal rate and regular rhythm.      Heart sounds: Normal heart sounds. No murmur heard.  Pulmonary:      Effort: Pulmonary effort is normal.      Breath sounds: Normal breath sounds. No wheezing or rales.   Abdominal:      General: There is no distension.      Palpations: Abdomen is soft.   Musculoskeletal:      Cervical back: Neck supple.      Right lower leg: No edema.      Left lower leg: No edema.   Lymphadenopathy:      Cervical: No cervical adenopathy.   Skin:     General: Skin is warm and dry.   Neurological:      Mental Status: She is alert and oriented to person, place, and time.   Psychiatric:         Mood and Affect: Mood normal.         Behavior: Behavior normal.         Thought Content: Thought content normal.         Judgment: Judgment normal.         Assessment:       1. Prediabetes    2. Crohn's disease with complication, unspecified gastrointestinal tract location    3. Encounter for screening mammogram for malignant neoplasm of breast    4. Myalgia    5. Other migraine without status migrainosus, not intractable        Plan:   Prediabetes  Comments:  A1c today.  Follow-up 1 year  Orders:  -     Comprehensive Metabolic Panel; Future; Expected date: 08/05/2022  -     Hemoglobin A1C; Future; Expected date: 08/05/2022  -     TSH; Future; Expected date: 08/05/2022    Crohn's disease with complication, unspecified gastrointestinal tract location  Comments:  Stable    Encounter for screening mammogram for malignant neoplasm of breast  Comments:  Scheduled for later this month    Myalgia  Comments:  CMP    Other migraine without status migrainosus, not intractable  Comments:  Infrequent.  P.r.n. Fioricet, muscle relaxants.  Followed by [facility]

## 2022-10-14 ENCOUNTER — HOSPITAL ENCOUNTER (OUTPATIENT)
Dept: RADIOLOGY | Facility: HOSPITAL | Age: 57
Discharge: HOME | End: 2022-10-14
Attending: FAMILY MEDICINE
Payer: COMMERCIAL

## 2022-10-14 DIAGNOSIS — Z12.31 ENCOUNTER FOR SCREENING MAMMOGRAM FOR MALIGNANT NEOPLASM OF BREAST: ICD-10-CM

## 2022-10-14 PROCEDURE — 77067 SCR MAMMO BI INCL CAD: CPT | Mod: TC

## 2022-10-14 PROCEDURE — 77063 BREAST TOMOSYNTHESIS BI: CPT | Mod: 26,,, | Performed by: RADIOLOGY

## 2022-10-14 PROCEDURE — 77063 MAMMO DIGITAL SCREENING BILAT WITH TOMO: ICD-10-PCS | Mod: 26,,, | Performed by: RADIOLOGY

## 2022-10-14 PROCEDURE — 77063 BREAST TOMOSYNTHESIS BI: CPT | Mod: TC

## 2022-10-14 PROCEDURE — 77067 MAMMO DIGITAL SCREENING BILAT WITH TOMO: ICD-10-PCS | Mod: 26,,, | Performed by: RADIOLOGY

## 2022-10-14 PROCEDURE — 77067 SCR MAMMO BI INCL CAD: CPT | Mod: 26,,, | Performed by: RADIOLOGY

## 2022-10-24 ENCOUNTER — PATIENT MESSAGE (OUTPATIENT)
Dept: FAMILY MEDICINE | Facility: CLINIC | Age: 57
End: 2022-10-24
Payer: COMMERCIAL

## 2022-11-14 ENCOUNTER — LAB VISIT (OUTPATIENT)
Dept: LAB | Facility: HOSPITAL | Age: 57
End: 2022-11-14
Attending: FAMILY MEDICINE
Payer: COMMERCIAL

## 2022-11-14 ENCOUNTER — OFFICE VISIT (OUTPATIENT)
Dept: FAMILY MEDICINE | Facility: CLINIC | Age: 57
End: 2022-11-14
Payer: COMMERCIAL

## 2022-11-14 VITALS
BODY MASS INDEX: 28.56 KG/M2 | SYSTOLIC BLOOD PRESSURE: 157 MMHG | HEIGHT: 62 IN | HEART RATE: 81 BPM | DIASTOLIC BLOOD PRESSURE: 111 MMHG | WEIGHT: 155.19 LBS | OXYGEN SATURATION: 94 % | TEMPERATURE: 98 F

## 2022-11-14 DIAGNOSIS — M79.661 BILATERAL CALF PAIN: ICD-10-CM

## 2022-11-14 DIAGNOSIS — G43.809 OTHER MIGRAINE WITHOUT STATUS MIGRAINOSUS, NOT INTRACTABLE: ICD-10-CM

## 2022-11-14 DIAGNOSIS — K50.919 CROHN'S DISEASE WITH COMPLICATION, UNSPECIFIED GASTROINTESTINAL TRACT LOCATION: ICD-10-CM

## 2022-11-14 DIAGNOSIS — R73.03 PREDIABETES: ICD-10-CM

## 2022-11-14 DIAGNOSIS — M79.662 BILATERAL CALF PAIN: ICD-10-CM

## 2022-11-14 DIAGNOSIS — K63.5 POLYP OF COLON, UNSPECIFIED PART OF COLON, UNSPECIFIED TYPE: ICD-10-CM

## 2022-11-14 DIAGNOSIS — R25.2 LEG CRAMPS: Primary | ICD-10-CM

## 2022-11-14 DIAGNOSIS — R25.2 LEG CRAMPS: ICD-10-CM

## 2022-11-14 DIAGNOSIS — R03.0 BLOOD PRESSURE ELEVATED WITHOUT HISTORY OF HTN: ICD-10-CM

## 2022-11-14 LAB
BASOPHILS # BLD AUTO: 0.04 K/UL (ref 0–0.2)
BASOPHILS NFR BLD: 0.6 % (ref 0–1.9)
CK SERPL-CCNC: 64 U/L (ref 20–180)
DIFFERENTIAL METHOD: NORMAL
EOSINOPHIL # BLD AUTO: 0.1 K/UL (ref 0–0.5)
EOSINOPHIL NFR BLD: 2 % (ref 0–8)
ERYTHROCYTE [DISTWIDTH] IN BLOOD BY AUTOMATED COUNT: 12.5 % (ref 11.5–14.5)
HCT VFR BLD AUTO: 41.6 % (ref 37–48.5)
HGB BLD-MCNC: 13.3 G/DL (ref 12–16)
IMM GRANULOCYTES # BLD AUTO: 0.03 K/UL (ref 0–0.04)
IMM GRANULOCYTES NFR BLD AUTO: 0.4 % (ref 0–0.5)
LYMPHOCYTES # BLD AUTO: 2.6 K/UL (ref 1–4.8)
LYMPHOCYTES NFR BLD: 36 % (ref 18–48)
MCH RBC QN AUTO: 27.7 PG (ref 27–31)
MCHC RBC AUTO-ENTMCNC: 32 G/DL (ref 32–36)
MCV RBC AUTO: 87 FL (ref 82–98)
MONOCYTES # BLD AUTO: 0.4 K/UL (ref 0.3–1)
MONOCYTES NFR BLD: 6 % (ref 4–15)
NEUTROPHILS # BLD AUTO: 3.9 K/UL (ref 1.8–7.7)
NEUTROPHILS NFR BLD: 55 % (ref 38–73)
NRBC BLD-RTO: 0 /100 WBC
PLATELET # BLD AUTO: 251 K/UL (ref 150–450)
PMV BLD AUTO: 11.5 FL (ref 9.2–12.9)
RBC # BLD AUTO: 4.8 M/UL (ref 4–5.4)
WBC # BLD AUTO: 7.16 K/UL (ref 3.9–12.7)

## 2022-11-14 PROCEDURE — 99214 OFFICE O/P EST MOD 30 MIN: CPT | Mod: S$GLB,,, | Performed by: FAMILY MEDICINE

## 2022-11-14 PROCEDURE — 99214 PR OFFICE/OUTPT VISIT, EST, LEVL IV, 30-39 MIN: ICD-10-PCS | Mod: S$GLB,,, | Performed by: FAMILY MEDICINE

## 2022-11-14 PROCEDURE — 85379 FIBRIN DEGRADATION QUANT: CPT | Performed by: FAMILY MEDICINE

## 2022-11-14 PROCEDURE — 85025 COMPLETE CBC W/AUTO DIFF WBC: CPT | Performed by: FAMILY MEDICINE

## 2022-11-14 PROCEDURE — 99999 PR PBB SHADOW E&M-EST. PATIENT-LVL IV: CPT | Mod: PBBFAC,,, | Performed by: FAMILY MEDICINE

## 2022-11-14 PROCEDURE — 82550 ASSAY OF CK (CPK): CPT | Performed by: FAMILY MEDICINE

## 2022-11-14 PROCEDURE — 36415 COLL VENOUS BLD VENIPUNCTURE: CPT | Mod: PO | Performed by: FAMILY MEDICINE

## 2022-11-14 PROCEDURE — 99999 PR PBB SHADOW E&M-EST. PATIENT-LVL IV: ICD-10-PCS | Mod: PBBFAC,,, | Performed by: FAMILY MEDICINE

## 2022-11-14 NOTE — PROGRESS NOTES
Subjective:       Patient ID: Kamilla Leavitt is a 57 y.o. female.    Chief Complaint: Leg Pain      HPI Comments:       Current Outpatient Medications:     butalbital-acetaminophen-caffeine -40 mg (FIORICET, ESGIC) -40 mg per tablet, Take 1 tablet by mouth every 4 (four) hours as needed for Pain., Disp: 30 tablet, Rfl: 0     Recent weeks began having crampy pains in her calves.  Mostly when she is been standing for awhile.  Not exertional.  Not at nighttime particularly.  Had a longstanding problem with achiness in her thighs and arms prior to this.  Also has some pain in her left knee.  In general left leg is worse than the right with respect to the cramps, but either 1 can affect her independently of the other.  Occasional sciatic pain    Saw [facility] neurologist.  Dr. Barton.  Continue Fioricet as needed.  Rarely uses.      Does not currently have a GI doctor.  Has had Crohn's for over 30 years.  Due for colonoscopy.  Will order now.  She would like to schedule it in February    Since our last visit she had a normal mammogram.  A1c 5.6.  CMP normal electrolytes and calcium.  TSH normal    Leg Pain     Review of Systems   Constitutional:  Negative for activity change and unexpected weight change.   HENT:  Negative for hearing loss, rhinorrhea and trouble swallowing.    Eyes:  Negative for discharge and visual disturbance.   Respiratory:  Negative for chest tightness and wheezing.    Cardiovascular:  Negative for chest pain and palpitations.   Gastrointestinal:  Negative for blood in stool, constipation, diarrhea and vomiting.   Endocrine: Negative for polydipsia and polyuria.   Genitourinary:  Negative for difficulty urinating, dysuria, hematuria and menstrual problem.   Musculoskeletal:  Positive for arthralgias, myalgias and neck pain. Negative for joint swelling.   Neurological:  Positive for headaches. Negative for weakness.   Psychiatric/Behavioral:  Negative for confusion and  "dysphoric mood.      Objective:      Vitals:    11/14/22 1427 11/14/22 1519   BP: (!) 152/96 (!) 157/111   Pulse: 81    Temp: 97.6 °F (36.4 °C)    SpO2: (!) 94%    Weight: 70.4 kg (155 lb 3.3 oz)    Height: 5' 2" (1.575 m)    PainSc:   2    PainLoc: Leg      Physical Exam  Vitals and nursing note reviewed.   Constitutional:       General: She is not in acute distress.     Appearance: She is well-developed. She is not diaphoretic.   HENT:      Head: Normocephalic.      Mouth/Throat:      Pharynx: No oropharyngeal exudate.   Neck:      Thyroid: No thyromegaly.   Cardiovascular:      Rate and Rhythm: Normal rate and regular rhythm.      Pulses: No decreased pulses.           Dorsalis pedis pulses are 1+ on the right side and 1+ on the left side.        Posterior tibial pulses are 1+ on the right side and 1+ on the left side.      Heart sounds: Normal heart sounds. No murmur heard.  Pulmonary:      Effort: Pulmonary effort is normal.      Breath sounds: Normal breath sounds. No wheezing or rales.   Abdominal:      General: There is no distension.      Palpations: Abdomen is soft.   Musculoskeletal:      Cervical back: Neck supple.      Left knee: Crepitus present. Tenderness present over the medial joint line and lateral joint line.      Right lower leg: No swelling or tenderness. No edema.      Left lower leg: No swelling or tenderness. No edema.   Lymphadenopathy:      Cervical: No cervical adenopathy.   Skin:     General: Skin is warm and dry.   Neurological:      Mental Status: She is alert and oriented to person, place, and time.   Psychiatric:         Behavior: Behavior normal.         Thought Content: Thought content normal.         Judgment: Judgment normal.       Assessment:       1. Leg cramps    2. Prediabetes    3. Crohn's disease with complication, unspecified gastrointestinal tract location    4. Other migraine without status migrainosus, not intractable    5. Bilateral calf pain    6. Polyp of colon, " unspecified part of colon, unspecified type    7. Blood pressure elevated without history of HTN          Plan:   Leg cramps  Comments:   not obvious claudications.  Normal CMP.  Check CPK.  ABIs  Orders:  -     D-DIMER, QUANTITATIVE; Future; Expected date: 11/14/2022    Prediabetes  Comments:   last A1c 5.6.    Crohn's disease with complication, unspecified gastrointestinal tract location  Comments:   mostly in remission  Orders:  -     Ambulatory referral/consult to Endo Procedure ; Future; Expected date: 11/15/2022    Other migraine without status migrainosus, not intractable  Comments:   rare headaches.  Takes Fioricet as needed    Bilateral calf pain  Comments:   as above  Orders:  -     CK; Future; Expected date: 11/14/2022  -     CBC Auto Differential; Future; Expected date: 11/14/2022  -     VAS US Ankle Brachial Indices with Exercise; Future    Polyp of colon, unspecified part of colon, unspecified type  Comments:   colonoscopy ordered  Orders:  -     Ambulatory referral/consult to Endo Procedure ; Future; Expected date: 11/15/2022    Blood pressure elevated without history of HTN  Comments:   follow-up 1 month

## 2022-11-15 ENCOUNTER — PATIENT MESSAGE (OUTPATIENT)
Dept: FAMILY MEDICINE | Facility: CLINIC | Age: 57
End: 2022-11-15
Payer: COMMERCIAL

## 2022-11-15 LAB — D DIMER PPP IA.FEU-MCNC: 0.37 MG/L FEU

## 2022-11-16 ENCOUNTER — HOSPITAL ENCOUNTER (OUTPATIENT)
Dept: CARDIOLOGY | Facility: HOSPITAL | Age: 57
Discharge: HOME | End: 2022-11-16
Attending: FAMILY MEDICINE
Payer: COMMERCIAL

## 2022-11-16 VITALS
SYSTOLIC BLOOD PRESSURE: 147 MMHG | WEIGHT: 155 LBS | HEIGHT: 62 IN | BODY MASS INDEX: 28.52 KG/M2 | DIASTOLIC BLOOD PRESSURE: 82 MMHG

## 2022-11-16 DIAGNOSIS — M79.662 BILATERAL CALF PAIN: ICD-10-CM

## 2022-11-16 DIAGNOSIS — M79.661 BILATERAL CALF PAIN: ICD-10-CM

## 2022-11-16 LAB
ABI POST MINUTES1: 2 MIN
IMMEDIATE ARM BP: 185 MMHG
IMMEDIATE LEFT ABI: 0.93
IMMEDIATE LEFT TIBIAL: 172 MMHG
IMMEDIATE RIGHT ABI: 1.03
IMMEDIATE RIGHT TIBIAL: 190 MMHG
LEFT ABI: 1.22
LEFT ARM BP: 147 MMHG
LEFT DORSALIS PEDIS: 168 MMHG
LEFT POSTERIOR TIBIAL: 180 MMHG
LEFT TBI: 1.01
LEFT TOE PRESSURE: 149 MMHG
POST1 ARM BP: 149 MMHG
POST1 LEFT ABI: 1.05
POST1 LEFT TIBIAL: 156 MMHG
POST1 RIGHT ABI: 1.15
POST1 RIGHT TIBIAL: 172 MMHG
RIGHT ABI: 1.25
RIGHT ARM BP: 146 MMHG
RIGHT DORSALIS PEDIS: 184 MMHG
RIGHT POSTERIOR TIBIAL: 178 MMHG
RIGHT TBI: 1.12
RIGHT TOE PRESSURE: 164 MMHG
TREADMILL GRADE: 10 %
TREADMILL SPEED: 2 MPH
TREADMILL TIME: 5 MIN

## 2022-11-16 PROCEDURE — 93924 ANKLE BRACHIAL INDICES (ABI): ICD-10-PCS | Mod: 26,,, | Performed by: INTERNAL MEDICINE

## 2022-11-16 PROCEDURE — 93924 LWR XTR VASC STDY BILAT: CPT | Mod: 26,,, | Performed by: INTERNAL MEDICINE

## 2022-11-16 PROCEDURE — 93924 LWR XTR VASC STDY BILAT: CPT

## 2022-11-18 ENCOUNTER — PATIENT MESSAGE (OUTPATIENT)
Dept: FAMILY MEDICINE | Facility: CLINIC | Age: 57
End: 2022-11-18
Payer: COMMERCIAL

## 2022-12-12 ENCOUNTER — OFFICE VISIT (OUTPATIENT)
Dept: FAMILY MEDICINE | Facility: CLINIC | Age: 57
End: 2022-12-12
Payer: COMMERCIAL

## 2022-12-12 VITALS
OXYGEN SATURATION: 97 % | TEMPERATURE: 98 F | SYSTOLIC BLOOD PRESSURE: 126 MMHG | DIASTOLIC BLOOD PRESSURE: 84 MMHG | BODY MASS INDEX: 28.72 KG/M2 | HEIGHT: 62 IN | HEART RATE: 85 BPM | WEIGHT: 156.06 LBS

## 2022-12-12 DIAGNOSIS — K63.5 POLYP OF COLON, UNSPECIFIED PART OF COLON, UNSPECIFIED TYPE: ICD-10-CM

## 2022-12-12 DIAGNOSIS — G43.809 OTHER MIGRAINE WITHOUT STATUS MIGRAINOSUS, NOT INTRACTABLE: ICD-10-CM

## 2022-12-12 DIAGNOSIS — R25.2 LEG CRAMPS: ICD-10-CM

## 2022-12-12 DIAGNOSIS — R73.03 PREDIABETES: ICD-10-CM

## 2022-12-12 DIAGNOSIS — R03.0 BLOOD PRESSURE ELEVATED WITHOUT HISTORY OF HTN: Primary | ICD-10-CM

## 2022-12-12 PROCEDURE — 99214 PR OFFICE/OUTPT VISIT, EST, LEVL IV, 30-39 MIN: ICD-10-PCS | Mod: S$GLB,,, | Performed by: FAMILY MEDICINE

## 2022-12-12 PROCEDURE — 99999 PR PBB SHADOW E&M-EST. PATIENT-LVL III: ICD-10-PCS | Mod: PBBFAC,,, | Performed by: FAMILY MEDICINE

## 2022-12-12 PROCEDURE — 99999 PR PBB SHADOW E&M-EST. PATIENT-LVL III: CPT | Mod: PBBFAC,,, | Performed by: FAMILY MEDICINE

## 2022-12-12 PROCEDURE — 99214 OFFICE O/P EST MOD 30 MIN: CPT | Mod: S$GLB,,, | Performed by: FAMILY MEDICINE

## 2022-12-12 RX ORDER — BUTALBITAL, ACETAMINOPHEN AND CAFFEINE 50; 325; 40 MG/1; MG/1; MG/1
1 TABLET ORAL EVERY 4 HOURS PRN
Qty: 30 TABLET | Refills: 0 | Status: SHIPPED | OUTPATIENT
Start: 2022-12-12

## 2022-12-12 NOTE — PROGRESS NOTES
"Subjective:       Patient ID: Kamilla Leavitt is a 57 y.o. female.    Chief Complaint: Hypertension      HPI Comments:       Current Outpatient Medications:     butalbital-acetaminophen-caffeine -40 mg (FIORICET, ESGIC) -40 mg per tablet, Take 1 tablet by mouth every 4 (four) hours as needed for Pain., Disp: 30 tablet, Rfl: 0    One-month follow-up.  Elevated blood pressure last time.  No blood pressure checks at home, but she plans to get a cuff and start checking herself.    Blood pressure normal today.      Plans to get her colonoscopy after the 1st of the year.      Requests refill on Fioricet for occasional headaches.  Rarely has them but did have a cluster of them this weekend.  Sees the neurologist (Oralia  at [facility]) in the spring    Leg cramps have responded moderately to the B12 complex.  She takes it once a day.  Encouraged her to try twice a day.  Also relieved with pickle juice.    Hypertension  Associated symptoms include headaches. Pertinent negatives include no chest pain or shortness of breath.   Review of Systems   Constitutional:  Negative for activity change, appetite change and fever.   HENT:  Negative for sore throat.    Respiratory:  Negative for cough and shortness of breath.    Cardiovascular:  Negative for chest pain.   Gastrointestinal:  Negative for abdominal pain, diarrhea and nausea.   Genitourinary:  Negative for difficulty urinating.   Musculoskeletal:  Positive for myalgias. Negative for arthralgias.   Neurological:  Positive for headaches. Negative for dizziness.     Objective:      Vitals:    12/12/22 1533   BP: 126/84   Pulse: 85   Temp: 97.8 °F (36.6 °C)   SpO2: 97%   Weight: 70.8 kg (156 lb 1.4 oz)   Height: 5' 2" (1.575 m)   PainSc: 0-No pain     Physical Exam  Constitutional:       Appearance: She is not ill-appearing.   HENT:      Head: Normocephalic.   Pulmonary:      Effort: Pulmonary effort is normal. No respiratory distress.   Musculoskeletal: "      Cervical back: Neck supple.   Neurological:      Mental Status: She is alert and oriented to person, place, and time.   Psychiatric:         Mood and Affect: Mood normal.         Behavior: Behavior normal.         Thought Content: Thought content normal.         Judgment: Judgment normal.       Assessment:       1. Blood pressure elevated without history of HTN    2. Prediabetes    3. Polyp of colon, unspecified part of colon, unspecified type    4. Leg cramps    5. Other migraine without status migrainosus, not intractable          Plan:   Blood pressure elevated without history of HTN  Comments:  Normal today.  She will start measuring her blood pressure at home and let me know if it goes up.  Otherwise follow-up in a year    Prediabetes  Comments:  A1c 5.6.  Fasting lipid profile  Orders:  -     Lipid Panel; Future; Expected date: 12/12/2022    Polyp of colon, unspecified part of colon, unspecified type  Comments:  Plans to do a colonoscopy after the 1st of the year    Leg cramps  Comments:  Some improvement on vitamin-B complex.  May increase to b.i.d. dosing.  Pickle juice also helps    Other migraine without status migrainosus, not intractable  Comments:  Refill Fioricet X1.  Dr. Barton  to prescribe moving forward (sees in the spring)  Orders:  -     butalbital-acetaminophen-caffeine -40 mg (FIORICET, ESGIC) -40 mg per tablet; Take 1 tablet by mouth every 4 (four) hours as needed for Pain.  Dispense: 30 tablet; Refill: 0

## 2022-12-22 ENCOUNTER — PATIENT MESSAGE (OUTPATIENT)
Dept: FAMILY MEDICINE | Facility: CLINIC | Age: 57
End: 2022-12-22
Payer: COMMERCIAL

## 2022-12-23 RX ORDER — LISINOPRIL 10 MG/1
10 TABLET ORAL DAILY
Qty: 30 TABLET | Refills: 1 | Status: SHIPPED | OUTPATIENT
Start: 2022-12-23 | End: 2023-01-23 | Stop reason: SDUPTHER

## 2023-01-03 ENCOUNTER — PATIENT MESSAGE (OUTPATIENT)
Dept: FAMILY MEDICINE | Facility: CLINIC | Age: 58
End: 2023-01-03
Payer: COMMERCIAL

## 2023-01-06 ENCOUNTER — LAB VISIT (OUTPATIENT)
Dept: LAB | Facility: HOSPITAL | Age: 58
End: 2023-01-06
Attending: FAMILY MEDICINE
Payer: COMMERCIAL

## 2023-01-06 DIAGNOSIS — R73.03 PREDIABETES: ICD-10-CM

## 2023-01-06 PROCEDURE — 36415 COLL VENOUS BLD VENIPUNCTURE: CPT | Mod: PO | Performed by: FAMILY MEDICINE

## 2023-01-06 PROCEDURE — 80061 LIPID PANEL: CPT | Performed by: FAMILY MEDICINE

## 2023-01-07 LAB
CHOLEST SERPL-MCNC: 210 MG/DL (ref 120–199)
CHOLEST/HDLC SERPL: 3.6 {RATIO} (ref 2–5)
HDLC SERPL-MCNC: 59 MG/DL (ref 40–75)
HDLC SERPL: 28.1 % (ref 20–50)
LDLC SERPL CALC-MCNC: 132.6 MG/DL (ref 63–159)
NONHDLC SERPL-MCNC: 151 MG/DL
TRIGL SERPL-MCNC: 92 MG/DL (ref 30–150)

## 2023-01-23 ENCOUNTER — PATIENT MESSAGE (OUTPATIENT)
Dept: FAMILY MEDICINE | Facility: CLINIC | Age: 58
End: 2023-01-23

## 2023-01-23 ENCOUNTER — LAB VISIT (OUTPATIENT)
Dept: LAB | Facility: HOSPITAL | Age: 58
End: 2023-01-23
Attending: FAMILY MEDICINE
Payer: COMMERCIAL

## 2023-01-23 ENCOUNTER — OFFICE VISIT (OUTPATIENT)
Dept: FAMILY MEDICINE | Facility: CLINIC | Age: 58
End: 2023-01-23
Payer: COMMERCIAL

## 2023-01-23 VITALS
BODY MASS INDEX: 28.44 KG/M2 | HEIGHT: 62 IN | HEART RATE: 95 BPM | DIASTOLIC BLOOD PRESSURE: 74 MMHG | TEMPERATURE: 98 F | SYSTOLIC BLOOD PRESSURE: 112 MMHG | WEIGHT: 154.56 LBS | OXYGEN SATURATION: 97 %

## 2023-01-23 DIAGNOSIS — G43.809 OTHER MIGRAINE WITHOUT STATUS MIGRAINOSUS, NOT INTRACTABLE: ICD-10-CM

## 2023-01-23 DIAGNOSIS — I10 PRIMARY HYPERTENSION: Primary | ICD-10-CM

## 2023-01-23 DIAGNOSIS — I10 PRIMARY HYPERTENSION: ICD-10-CM

## 2023-01-23 DIAGNOSIS — R73.03 PREDIABETES: ICD-10-CM

## 2023-01-23 DIAGNOSIS — K63.5 POLYP OF COLON, UNSPECIFIED PART OF COLON, UNSPECIFIED TYPE: ICD-10-CM

## 2023-01-23 LAB
ANION GAP SERPL CALC-SCNC: 9 MMOL/L (ref 8–16)
BUN SERPL-MCNC: 17 MG/DL (ref 6–20)
CALCIUM SERPL-MCNC: 10 MG/DL (ref 8.7–10.5)
CHLORIDE SERPL-SCNC: 105 MMOL/L (ref 95–110)
CO2 SERPL-SCNC: 25 MMOL/L (ref 23–29)
CREAT SERPL-MCNC: 0.8 MG/DL (ref 0.5–1.4)
EST. GFR  (NO RACE VARIABLE): >60 ML/MIN/1.73 M^2
GLUCOSE SERPL-MCNC: 106 MG/DL (ref 70–110)
POTASSIUM SERPL-SCNC: 3.7 MMOL/L (ref 3.5–5.1)
SODIUM SERPL-SCNC: 139 MMOL/L (ref 136–145)

## 2023-01-23 PROCEDURE — 99999 PR PBB SHADOW E&M-EST. PATIENT-LVL III: CPT | Mod: PBBFAC,,, | Performed by: FAMILY MEDICINE

## 2023-01-23 PROCEDURE — 80048 BASIC METABOLIC PNL TOTAL CA: CPT | Performed by: FAMILY MEDICINE

## 2023-01-23 PROCEDURE — 99999 PR PBB SHADOW E&M-EST. PATIENT-LVL III: ICD-10-PCS | Mod: PBBFAC,,, | Performed by: FAMILY MEDICINE

## 2023-01-23 PROCEDURE — 99214 PR OFFICE/OUTPT VISIT, EST, LEVL IV, 30-39 MIN: ICD-10-PCS | Mod: S$GLB,,, | Performed by: FAMILY MEDICINE

## 2023-01-23 PROCEDURE — 36415 COLL VENOUS BLD VENIPUNCTURE: CPT | Mod: PO | Performed by: FAMILY MEDICINE

## 2023-01-23 PROCEDURE — 99214 OFFICE O/P EST MOD 30 MIN: CPT | Mod: S$GLB,,, | Performed by: FAMILY MEDICINE

## 2023-01-23 RX ORDER — LISINOPRIL 10 MG/1
10 TABLET ORAL DAILY
Qty: 90 TABLET | Refills: 1 | Status: SHIPPED | OUTPATIENT
Start: 2023-01-23 | End: 2023-04-28 | Stop reason: SDUPTHER

## 2023-01-23 NOTE — PROGRESS NOTES
"Subjective:       Patient ID: Kamilla Leavitt is a 57 y.o. female.    Chief Complaint: Hypertension      HPI Comments:       Current Outpatient Medications:     butalbital-acetaminophen-caffeine -40 mg (FIORICET, ESGIC) -40 mg per tablet, Take 1 tablet by mouth every 4 (four) hours as needed for Pain., Disp: 30 tablet, Rfl: 0    lisinopriL 10 MG tablet, Take 1 tablet (10 mg total) by mouth once daily., Disp: 30 tablet, Rfl: 1      Short-term follow-up after starting lisinopril last time.  After several weeks a blood pressure started remaining normal.    Her headache frequency has also decreased.  From once or twice per week to almost none.    Leg cramps continued to be doing well.    Plans to do colonoscopy sometime in the next several months    Recent labs:      Review of Systems   Constitutional:  Negative for activity change, appetite change and fever.   HENT:  Negative for sore throat.    Respiratory:  Negative for cough and shortness of breath.    Cardiovascular:  Negative for chest pain.   Gastrointestinal:  Negative for abdominal pain, diarrhea and nausea.   Genitourinary:  Negative for difficulty urinating.   Musculoskeletal:  Negative for arthralgias and myalgias.   Neurological:  Negative for dizziness and headaches.     Objective:      Vitals:    01/23/23 1359   BP: 112/74   Pulse: 95   Temp: 98.4 °F (36.9 °C)   SpO2: 97%   Weight: 70.1 kg (154 lb 8.7 oz)   Height: 5' 2" (1.575 m)   PainSc: 0-No pain     Physical Exam  Vitals and nursing note reviewed.   Constitutional:       General: She is not in acute distress.     Appearance: She is well-developed. She is not diaphoretic.   HENT:      Head: Normocephalic.   Neck:      Thyroid: No thyromegaly.   Cardiovascular:      Rate and Rhythm: Normal rate and regular rhythm.      Heart sounds: Normal heart sounds. No murmur heard.  Pulmonary:      Effort: Pulmonary effort is normal.      Breath sounds: Normal breath sounds. No wheezing or " rales.   Abdominal:      General: There is no distension.      Palpations: Abdomen is soft.   Musculoskeletal:      Cervical back: Neck supple.      Right lower leg: No edema.      Left lower leg: No edema.   Lymphadenopathy:      Cervical: No cervical adenopathy.   Skin:     General: Skin is warm and dry.   Neurological:      Mental Status: She is alert and oriented to person, place, and time.   Psychiatric:         Mood and Affect: Mood normal.         Behavior: Behavior normal.         Thought Content: Thought content normal.         Judgment: Judgment normal.       Assessment:       1. Primary hypertension    2. Prediabetes    3. Polyp of colon, unspecified part of colon, unspecified type    4. Other migraine without status migrainosus, not intractable          Plan:   Primary hypertension  Comments:  Controlled with lisinopril.  Follow-up 6 months.  [facility]  Orders:  -     Basic Metabolic Panel; Future; Expected date: 01/23/2023    Prediabetes  Comments:  Stable with recent A1c 5.6    Polyp of colon, unspecified part of colon, unspecified type  Comments:  Will get colonoscopy soon    Other migraine without status migrainosus, not intractable  Comments:  Improved blood pressure control

## 2023-03-30 ENCOUNTER — PATIENT MESSAGE (OUTPATIENT)
Dept: FAMILY MEDICINE | Facility: CLINIC | Age: 58
End: 2023-03-30
Payer: COMMERCIAL

## 2023-04-05 ENCOUNTER — OFFICE VISIT (OUTPATIENT)
Dept: FAMILY MEDICINE | Facility: CLINIC | Age: 58
End: 2023-04-05
Payer: COMMERCIAL

## 2023-04-05 VITALS
HEIGHT: 62 IN | HEART RATE: 81 BPM | TEMPERATURE: 99 F | DIASTOLIC BLOOD PRESSURE: 70 MMHG | SYSTOLIC BLOOD PRESSURE: 110 MMHG | OXYGEN SATURATION: 96 % | WEIGHT: 156.06 LBS | BODY MASS INDEX: 28.72 KG/M2

## 2023-04-05 DIAGNOSIS — I10 PRIMARY HYPERTENSION: ICD-10-CM

## 2023-04-05 DIAGNOSIS — R73.03 PREDIABETES: ICD-10-CM

## 2023-04-05 DIAGNOSIS — M72.2 PLANTAR FASCIITIS OF RIGHT FOOT: Primary | ICD-10-CM

## 2023-04-05 DIAGNOSIS — J30.1 SEASONAL ALLERGIC RHINITIS DUE TO POLLEN: ICD-10-CM

## 2023-04-05 PROCEDURE — 99214 PR OFFICE/OUTPT VISIT, EST, LEVL IV, 30-39 MIN: ICD-10-PCS | Mod: S$GLB,,, | Performed by: FAMILY MEDICINE

## 2023-04-05 PROCEDURE — 99999 PR PBB SHADOW E&M-EST. PATIENT-LVL III: CPT | Mod: PBBFAC,,, | Performed by: FAMILY MEDICINE

## 2023-04-05 PROCEDURE — 99214 OFFICE O/P EST MOD 30 MIN: CPT | Mod: S$GLB,,, | Performed by: FAMILY MEDICINE

## 2023-04-05 PROCEDURE — 99999 PR PBB SHADOW E&M-EST. PATIENT-LVL III: ICD-10-PCS | Mod: PBBFAC,,, | Performed by: FAMILY MEDICINE

## 2023-04-05 RX ORDER — FLUTICASONE PROPIONATE 50 MCG
1 SPRAY, SUSPENSION (ML) NASAL DAILY
Qty: 18.2 ML | Refills: 1 | Status: SHIPPED | OUTPATIENT
Start: 2023-04-05

## 2023-04-05 NOTE — PROGRESS NOTES
Subjective:       Patient ID: Kamilla Leavitt is a 57 y.o. female.    Chief Complaint: No chief complaint on file.      HPI Comments:       Current Outpatient Medications:     butalbital-acetaminophen-caffeine -40 mg (FIORICET, ESGIC) -40 mg per tablet, Take 1 tablet by mouth every 4 (four) hours as needed for Pain., Disp: 30 tablet, Rfl: 0    lisinopriL 10 MG tablet, Take 1 tablet (10 mg total) by mouth once daily., Disp: 90 tablet, Rfl: 1    fluticasone propionate (FLONASE) 50 mcg/actuation nasal spray, 1 spray (50 mcg total) by Each Nostril route once daily., Disp: 18.2 mL, Rfl: 1      Two month history of right foot pain.  He will near the midfoot.  No injury or trauma.  Not taking any NSAIDs or icing it.  For step in the morning very painful.  First time she is had this.      Also complains of a sore throat since having a sinus infection about a month ago.  Lots of pollen exposure.  No sneezing or watery eyes but has had some cough and congestion.  Taking over-the-counter allergy pill once a day.  No sprays.  History of seasonal allergies    Sore Throat   This is a recurrent problem. The current episode started 1 to 4 weeks ago. The problem has been waxing and waning. Neither side of throat is experiencing more pain than the other. There has been no fever. The pain is at a severity of 0/10. The pain is moderate. Associated symptoms include coughing and trouble swallowing. Pertinent negatives include no abdominal pain, congestion, diarrhea, drooling, ear discharge, ear pain, headaches, hoarse voice, plugged ear sensation, neck pain, shortness of breath, stridor, swollen glands or vomiting. She has had no exposure to strep or mono. She has tried NSAIDs for the symptoms. The treatment provided mild relief.   Review of Systems   HENT:  Positive for sore throat and trouble swallowing. Negative for congestion, drooling, ear discharge, ear pain and hoarse voice.    Respiratory:  Positive for cough.  "Negative for shortness of breath and stridor.    Gastrointestinal:  Negative for abdominal pain, diarrhea and vomiting.   Musculoskeletal:  Positive for gait problem. Negative for neck pain.   Neurological:  Negative for headaches.     Objective:      Vitals:    04/05/23 1427   BP: 110/70   Pulse: 81   Temp: 98.8 °F (37.1 °C)   SpO2: 96%   Weight: 70.8 kg (156 lb 1.4 oz)   Height: 5' 2" (1.575 m)   PainSc:   6     Physical Exam  Vitals and nursing note reviewed.   Constitutional:       General: She is not in acute distress.     Appearance: She is well-developed. She is not diaphoretic.   HENT:      Head: Normocephalic.      Right Ear: Tympanic membrane, ear canal and external ear normal.      Left Ear: Tympanic membrane, ear canal and external ear normal.      Nose: Mucosal edema present. No rhinorrhea.      Mouth/Throat:      Pharynx: Pharyngeal swelling present. No oropharyngeal exudate or posterior oropharyngeal erythema.   Neck:      Thyroid: No thyromegaly.   Cardiovascular:      Rate and Rhythm: Normal rate and regular rhythm.      Heart sounds: Normal heart sounds. No murmur heard.  Pulmonary:      Effort: Pulmonary effort is normal.      Breath sounds: Normal breath sounds. No wheezing or rales.   Abdominal:      General: There is no distension.      Palpations: Abdomen is soft.   Musculoskeletal:      Cervical back: Neck supple.        Feet:    Feet:      Comments: Area of complaint and tenderness.  No posterior heel tenderness or nodules  Lymphadenopathy:      Cervical: No cervical adenopathy.   Skin:     General: Skin is warm and dry.   Neurological:      Mental Status: She is alert and oriented to person, place, and time.   Psychiatric:         Behavior: Behavior normal.         Thought Content: Thought content normal.         Judgment: Judgment normal.       Assessment:       1. Plantar fasciitis of right foot    2. Primary hypertension    3. Prediabetes    4. Seasonal allergic rhinitis due to pollen  "       Plan:   Plantar fasciitis of right foot  Comments:  Podiatry consult.  Ice and anti-inflammatories until then  Orders:  -     Ambulatory referral/consult to Podiatry; Future; Expected date: 04/12/2023    Primary hypertension  Comments:  Controlled.  Follow-up July    Prediabetes  Comments:  Stable    Seasonal allergic rhinitis due to pollen  Comments:  Causing postnasal drip.  Add Flonase to daily antihistamine.  For one-month    Other orders  -     fluticasone propionate (FLONASE) 50 mcg/actuation nasal spray; 1 spray (50 mcg total) by Each Nostril route once daily.  Dispense: 18.2 mL; Refill: 1

## 2023-04-12 ENCOUNTER — OFFICE VISIT (OUTPATIENT)
Dept: PODIATRY | Facility: CLINIC | Age: 58
End: 2023-04-12
Payer: COMMERCIAL

## 2023-04-12 DIAGNOSIS — Z87.19 HISTORY OF CROHN'S DISEASE: Chronic | ICD-10-CM

## 2023-04-12 DIAGNOSIS — M72.2 PLANTAR FASCIITIS OF RIGHT FOOT: ICD-10-CM

## 2023-04-12 DIAGNOSIS — M79.671 INFLAMMATORY HEEL PAIN, RIGHT: Primary | ICD-10-CM

## 2023-04-12 PROCEDURE — 99203 PR OFFICE/OUTPT VISIT, NEW, LEVL III, 30-44 MIN: ICD-10-PCS | Mod: S$GLB,,, | Performed by: PODIATRIST

## 2023-04-12 PROCEDURE — 99203 OFFICE O/P NEW LOW 30 MIN: CPT | Mod: S$GLB,,, | Performed by: PODIATRIST

## 2023-04-12 PROCEDURE — 99999 PR PBB SHADOW E&M-EST. PATIENT-LVL III: ICD-10-PCS | Mod: PBBFAC,,, | Performed by: PODIATRIST

## 2023-04-12 PROCEDURE — 99999 PR PBB SHADOW E&M-EST. PATIENT-LVL III: CPT | Mod: PBBFAC,,, | Performed by: PODIATRIST

## 2023-04-12 RX ORDER — PREDNISONE 10 MG/1
10 TABLET ORAL DAILY
Qty: 4 TABLET | Refills: 0 | Status: SHIPPED | OUTPATIENT
Start: 2023-04-12 | End: 2023-04-16

## 2023-04-12 NOTE — PROGRESS NOTES
Subjective:       Patient ID: Kamilla Leavitt is a 57 y.o. female.    Chief Complaint: Foot Pain (Right foot pain. X 2 months. Sx shooting pains. Changes shoes and soles of shoes. )      HPI: Kamilla Leavitt complains of severe pains to the right foot. States pains are sharp and stabbing-like in nature. Pains are to the plantar foot, mostly with walking and standing. Rates the pains at approx. 8/10. States post-static dyskinesia. Denies any recent identifiable trauma. Does limp with gait. No NSAID medications thus far. Pains have been present for the past several weeks to months and the pains have worsened over the past couple of weeks. States walking and standing causes and/or exacerbates the symptoms. Patient has had no corticosteroid injection(s) prior.  Does have history of colon/intestinal surgery secondary to history of Crohn's disease.  Unable take anti-inflammatories medications.  Patient's Primary Care Provider is Malik Kiser MD.     Review of patient's allergies indicates:   Allergen Reactions    Codeine Hives and Nausea Only       Past Medical History:   Diagnosis Date    Crohn's colitis        Family History   Problem Relation Age of Onset    Arthritis Mother     COPD Mother     Heart disease Mother     Hyperlipidemia Mother     Arthritis Father     COPD Father     Heart disease Father     Hypertension Father     Vision loss Father     Cancer Neg Hx        Social History     Socioeconomic History    Marital status:    Tobacco Use    Smoking status: Former    Smokeless tobacco: Never   Substance and Sexual Activity    Alcohol use: No    Drug use: No    Sexual activity: Yes     Partners: Male     Social Determinants of Health     Financial Resource Strain: Low Risk     Difficulty of Paying Living Expenses: Not hard at all   Food Insecurity: No Food Insecurity    Worried About Running Out of Food in the Last Year: Never true    Ran Out of Food in the Last Year: Never true   Transportation  Needs: No Transportation Needs    Lack of Transportation (Medical): No    Lack of Transportation (Non-Medical): No   Physical Activity: Inactive    Days of Exercise per Week: 0 days    Minutes of Exercise per Session: 0 min   Stress: Stress Concern Present    Feeling of Stress : To some extent   Social Connections: Unknown    Frequency of Communication with Friends and Family: More than three times a week    Frequency of Social Gatherings with Friends and Family: More than three times a week    Active Member of Clubs or Organizations: No    Attends Club or Organization Meetings: Never    Marital Status:    Housing Stability: Low Risk     Unable to Pay for Housing in the Last Year: No    Number of Places Lived in the Last Year: 1    Unstable Housing in the Last Year: No       Past Surgical History:   Procedure Laterality Date    ABDOMINAL SURGERY      bladder tuck      CHOLECYSTECTOMY      COLOSTOMY      COLOSTOMY CLOSURE      HERNIA REPAIR      HYSTERECTOMY      OOPHORECTOMY      TONSILLECTOMY         Review of Systems      Objective:   There were no vitals taken for this visit.    Ankle Brachial Indices (MATEO)  · Normal mateo with exercise bilaterally         Physical Exam  LOWER EXTREMITY PHYSICAL EXAMINATION    VASCULAR: The right DP pulse is 2/4 and the left DP is 2/4. The right PT pulse is 2/4 and the left PT pulse is 2/4. Proximal to distal, warm to warm. No dependent rubor or elevation palor is noted. Capillary refill time is less than 3 seconds. Hair growth is appreciated to the dorsal foot and digits.    NEUROLOGY: Proprioception is intact, bilateral. Sensation to light touch is intact. Negative Tinel's Sign and negative Valleix Sign. No neurological sensations with compression of the area of Alvarado's Nerve in the area of the Abductor Hallucis muscle belly.    ORTHOPEDIC:  Moderate tenderness to palpation of the area around the plantar medial calcaneal tubercle on the right foot. Pains are characterized  as sharp and stabbing-like with direct palpation of the area. There is also mild pain to palpation of the immediate plantar aspect of the heel, and no pains to the lateral band of the fascia. No edema is noted. No fullness is noted. No pains or defects are noted within the plantar fascia at the arch. No plantar fibromas are noted. No defects are noted within the ligament. Dorsiflexion of the MTPJs with simultaneous palpation of the fascia at the arch, does worsen and exacerbate the pains. No pains with medial to lateral compression of the heel. Equinus contracture is noted. Antalgic gait pattern is noted.     DERMATOLOGY: No ecchymosis is noted.  Skin is supple, dry and intact. Skin is supple.  No hyperkeratosis noted. No calluses.  No open wounds or ulcerations are noted.  No palpable plantar fibromas noted.    Assessment:     1. Inflammatory heel pain, right    2. Plantar fasciitis of right foot    3. History of Crohn's disease          Plan:     Inflammatory heel pain, right    Plantar fasciitis of right foot  Comments:  Podiatry consult.  Ice and anti-inflammatories until then  Orders:  -     Ambulatory referral/consult to Podiatry    History of Crohn's disease    Other orders  -     predniSONE (DELTASONE) 10 MG tablet; Take 1 tablet (10 mg total) by mouth once daily. for 4 doses  Dispense: 4 tablet; Refill: 0        Thorough discussion is had with the patient today concerning the diagnosis, its etiology, and the treatment algorithm at present.    I explained to the patient that etiology and all treatment options for heel pain including rest,  ice messages, stretching exercises, strappings/tappings, NSAID's, injections, new shoegear with orthotic inserts, and/or surgical treatment. I also discussed a possible injection of steroid to help calm down the inflammation sooner. I expressed the importance of wearing the orthotics in culmination of other treatment modalities. Patient agreed to stretching exercises and  inserts. I gave written and verbal instructions on heel cord stretching and this was demonstrated for the patient. Patient expressed understanding and had the patient teach back the instructions.  Patient instructed on adequate icing techniques which should be done for acute pain and inflammation.    Discussed the importance of stretching to the posterior muscle groups of the gastrocnemius and the soleus.  A stretching sheet was provided to the patient in conjunction with a Thera-Band.  I do recommend patient perform stretching exercises 4-6 times per day and holding the stretches for approximately 15-30 seconds apiece.  We discussed importance of stretching as relates to lengthening the posterior muscle group which can decrease drain on the posterior aspect of the heel as well as the plantar aspect of the heel.  This will also decrease pain associated with post static dyskinesia.  Teach back mechanism was performed with the patient demonstrating the stretching exercises.     We discussed the importance of wearing the orthotics to help elevate the arch which will allow for tension to be lessened to the plantar fascia and posterior heel cord.  Discussed the importance of the break-in period with the inserts by wearing them 2-3 hours for the 1st day and increasing their use an hour each day until able to tolerate a full work period.      Future Appointments   Date Time Provider Department Center   7/24/2023  2:20 PM Malik Kiser MD [facility]

## 2023-04-19 ENCOUNTER — PATIENT MESSAGE (OUTPATIENT)
Dept: ADMINISTRATIVE | Facility: HOSPITAL | Age: 58
End: 2023-04-19
Payer: COMMERCIAL

## 2023-04-28 ENCOUNTER — PATIENT MESSAGE (OUTPATIENT)
Dept: FAMILY MEDICINE | Facility: CLINIC | Age: 58
End: 2023-04-28
Payer: COMMERCIAL

## 2023-04-28 RX ORDER — LISINOPRIL 10 MG/1
10 TABLET ORAL DAILY
Qty: 90 TABLET | Refills: 1 | Status: SHIPPED | OUTPATIENT
Start: 2023-04-28 | End: 2023-09-21

## 2023-04-28 NOTE — TELEPHONE ENCOUNTER
No care due was identified.  [facility] Embedded Care Due Messages. Reference number: [id number].   4/28/2023 11:58:05 AM CDT

## 2023-06-26 ENCOUNTER — HOSPITAL ENCOUNTER (OUTPATIENT)
Dept: RADIOLOGY | Facility: HOSPITAL | Age: 58
Discharge: HOME | End: 2023-06-26
Attending: FAMILY MEDICINE
Payer: COMMERCIAL

## 2023-06-26 ENCOUNTER — OFFICE VISIT (OUTPATIENT)
Dept: FAMILY MEDICINE | Facility: CLINIC | Age: 58
End: 2023-06-26
Payer: COMMERCIAL

## 2023-06-26 VITALS
BODY MASS INDEX: 29.06 KG/M2 | OXYGEN SATURATION: 97 % | HEART RATE: 61 BPM | SYSTOLIC BLOOD PRESSURE: 120 MMHG | TEMPERATURE: 98 F | WEIGHT: 157.94 LBS | DIASTOLIC BLOOD PRESSURE: 70 MMHG | HEIGHT: 62 IN

## 2023-06-26 DIAGNOSIS — E78.2 MIXED HYPERLIPIDEMIA: ICD-10-CM

## 2023-06-26 DIAGNOSIS — M79.645 FINGER PAIN, LEFT: ICD-10-CM

## 2023-06-26 DIAGNOSIS — I10 PRIMARY HYPERTENSION: ICD-10-CM

## 2023-06-26 DIAGNOSIS — R73.03 PREDIABETES: ICD-10-CM

## 2023-06-26 DIAGNOSIS — K50.919 CROHN'S DISEASE WITH COMPLICATION, UNSPECIFIED GASTROINTESTINAL TRACT LOCATION: ICD-10-CM

## 2023-06-26 DIAGNOSIS — M79.645 FINGER PAIN, LEFT: Primary | ICD-10-CM

## 2023-06-26 DIAGNOSIS — K63.5 POLYP OF COLON, UNSPECIFIED PART OF COLON, UNSPECIFIED TYPE: ICD-10-CM

## 2023-06-26 DIAGNOSIS — M72.2 PLANTAR FASCIITIS OF RIGHT FOOT: ICD-10-CM

## 2023-06-26 PROCEDURE — 99214 PR OFFICE/OUTPT VISIT, EST, LEVL IV, 30-39 MIN: ICD-10-PCS | Mod: S$GLB,,, | Performed by: FAMILY MEDICINE

## 2023-06-26 PROCEDURE — 99214 OFFICE O/P EST MOD 30 MIN: CPT | Mod: S$GLB,,, | Performed by: FAMILY MEDICINE

## 2023-06-26 PROCEDURE — 73130 X-RAY EXAM OF HAND: CPT | Mod: TC,PO,LT

## 2023-06-26 PROCEDURE — 73130 X-RAY EXAM OF HAND: CPT | Mod: 26,LT,, | Performed by: RADIOLOGY

## 2023-06-26 PROCEDURE — 99999 PR PBB SHADOW E&M-EST. PATIENT-LVL IV: CPT | Mod: PBBFAC,,, | Performed by: FAMILY MEDICINE

## 2023-06-26 PROCEDURE — 99999 PR PBB SHADOW E&M-EST. PATIENT-LVL IV: ICD-10-PCS | Mod: PBBFAC,,, | Performed by: FAMILY MEDICINE

## 2023-06-26 PROCEDURE — 73130 XR HAND COMPLETE 3 VIEW LEFT: ICD-10-PCS | Mod: 26,LT,, | Performed by: RADIOLOGY

## 2023-06-26 NOTE — PROGRESS NOTES
"Subjective:       Patient ID: Kamilla Leavitt is a 58 y.o. female.    Chief Complaint: Hand Pain      HPI Comments:       Current Outpatient Medications:     butalbital-acetaminophen-caffeine -40 mg (FIORICET, ESGIC) -40 mg per tablet, Take 1 tablet by mouth every 4 (four) hours as needed for Pain., Disp: 30 tablet, Rfl: 0    fluticasone propionate (FLONASE) 50 mcg/actuation nasal spray, 1 spray (50 mcg total) by Each Nostril route once daily., Disp: 18.2 mL, Rfl: 1    lisinopriL 10 MG tablet, Take 1 tablet (10 mg total) by mouth once daily., Disp: 90 tablet, Rfl: 1      Unplanned follow-up.      Was working with her goats and the gait slammed up against her left hand about 2 weeks ago.  Had pain in 3rd and 4th fingers at the time.    Has not taken anything for the pain.  Has not sought out any medical care until now.      Today has pain only in the proximal aspect of the left ring finger.  Hurts when she puts her ring on.  Hurts at night and gets very stiff.    Due for colonoscopy.  Has not had any follow-up for colon polyps off for her Crohn's disease for many many years.  Crohn's has been relatively inactive.  Will reorder a colonoscopy today.    Review of Systems   Constitutional:  Negative for activity change, appetite change and fever.   HENT:  Negative for sore throat.    Respiratory:  Negative for cough and shortness of breath.    Cardiovascular:  Negative for chest pain.   Gastrointestinal:  Negative for abdominal pain, diarrhea and nausea.   Genitourinary:  Negative for difficulty urinating.   Musculoskeletal:  Negative for arthralgias and myalgias.   Neurological:  Negative for dizziness and headaches.     Objective:      Vitals:    06/26/23 1439   BP: 120/70   Pulse: 61   Temp: 97.6 °F (36.4 °C)   TempSrc: Tympanic   SpO2: 97%   Weight: 71.6 kg (157 lb 15.4 oz)   Height: 5' 2" (1.575 m)   PainSc:   6   PainLoc: Finger     Physical Exam  Vitals and nursing note reviewed.   Constitutional:  "      General: She is not in acute distress.     Appearance: She is well-developed. She is not diaphoretic.   HENT:      Head: Normocephalic.   Neck:      Thyroid: No thyromegaly.   Cardiovascular:      Rate and Rhythm: Normal rate and regular rhythm.      Heart sounds: Normal heart sounds. No murmur heard.  Pulmonary:      Effort: Pulmonary effort is normal.      Breath sounds: Normal breath sounds. No wheezing or rales.   Abdominal:      General: There is no distension.      Palpations: Abdomen is soft.   Musculoskeletal:      Right hand: Normal.      Left hand: Swelling, tenderness and bony tenderness present. No deformity. Decreased range of motion. Decreased strength. Normal capillary refill.        Hands:       Cervical back: Neck supple.      Right lower leg: No edema.      Left lower leg: No edema.      Comments: Area of complaint.  Mild swelling and diffuse tenderness.  No deformity.  Capillary refill normal.  Partial fist   Lymphadenopathy:      Cervical: No cervical adenopathy.   Skin:     General: Skin is warm and dry.   Neurological:      Mental Status: She is alert and oriented to person, place, and time.   Psychiatric:         Mood and Affect: Mood normal.         Behavior: Behavior normal.         Thought Content: Thought content normal.         Judgment: Judgment normal.       Assessment:       1. Finger pain, left    2. Primary hypertension    3. Prediabetes    4. Plantar fasciitis of right foot    5. Polyp of colon, unspecified part of colon, unspecified type    6. Crohn's disease with complication, unspecified gastrointestinal tract location    7. Mixed hyperlipidemia        Plan:   Finger pain, left  Comments:  X-ray.  Orthopedic consult.  Orders:  -     X-Ray Hand 2 View Left; Future; Expected date: 06/26/2023  -     Ambulatory referral/consult to Orthopedics; Future; Expected date: 07/03/2023    Primary hypertension  Comments:  Controlled.  Follow-up 6 months    Prediabetes  Comments:  A1c  today  Orders:  -     Comprehensive Metabolic Panel; Future; Expected date: 06/26/2023  -     Hemoglobin A1C; Future; Expected date: 06/26/2023    Plantar fasciitis of right foot  Comments:  Improved with inserts    Polyp of colon, unspecified part of colon, unspecified type  Comments:  Colonoscopy ordered  Orders:  -     Ambulatory referral/consult to Endo Procedure ; Future; Expected date: 06/27/2023    Crohn's disease with complication, unspecified gastrointestinal tract location  Comments:  Mostly inactive.  Colonoscopy ordered  Orders:  -     Ambulatory referral/consult to Endo Procedure ; Future; Expected date: 06/27/2023    Mixed hyperlipidemia  Comments:  .  ASCVD 10 year risk:  2.2%

## 2023-06-27 ENCOUNTER — HOSPITAL ENCOUNTER (OUTPATIENT)
Dept: PREADMISSION TESTING | Facility: HOSPITAL | Age: 58
Discharge: HOME | End: 2023-06-27
Attending: FAMILY MEDICINE
Payer: COMMERCIAL

## 2023-06-27 DIAGNOSIS — K63.5 POLYP OF COLON, UNSPECIFIED PART OF COLON, UNSPECIFIED TYPE: ICD-10-CM

## 2023-06-27 DIAGNOSIS — K50.919 CROHN'S DISEASE WITH COMPLICATION, UNSPECIFIED GASTROINTESTINAL TRACT LOCATION: ICD-10-CM

## 2023-07-07 ENCOUNTER — OFFICE VISIT (OUTPATIENT)
Dept: ORTHOPEDICS | Facility: CLINIC | Age: 58
End: 2023-07-07
Payer: COMMERCIAL

## 2023-07-07 VITALS — HEIGHT: 62 IN | BODY MASS INDEX: 29.05 KG/M2 | WEIGHT: 157.88 LBS

## 2023-07-07 DIAGNOSIS — M79.645 FINGER PAIN, LEFT: ICD-10-CM

## 2023-07-07 DIAGNOSIS — S69.92XA INJURY OF LEFT RING FINGER, INITIAL ENCOUNTER: Primary | ICD-10-CM

## 2023-07-07 PROCEDURE — 99203 OFFICE O/P NEW LOW 30 MIN: CPT | Mod: 25,S$GLB,,

## 2023-07-07 PROCEDURE — 20550 NJX 1 TENDON SHEATH/LIGAMENT: CPT | Mod: LT,S$GLB,,

## 2023-07-07 PROCEDURE — 99203 PR OFFICE/OUTPT VISIT, NEW, LEVL III, 30-44 MIN: ICD-10-PCS | Mod: 25,S$GLB,,

## 2023-07-07 PROCEDURE — 99999 PR PBB SHADOW E&M-EST. PATIENT-LVL III: CPT | Mod: PBBFAC,,,

## 2023-07-07 PROCEDURE — 99999 PR PBB SHADOW E&M-EST. PATIENT-LVL III: ICD-10-PCS | Mod: PBBFAC,,,

## 2023-07-07 PROCEDURE — 20550 TENDON SHEATH: ICD-10-PCS | Mod: LT,S$GLB,,

## 2023-07-07 RX ORDER — TRIAMCINOLONE ACETONIDE 40 MG/ML
40 INJECTION, SUSPENSION INTRA-ARTICULAR; INTRAMUSCULAR
Status: DISCONTINUED | OUTPATIENT
Start: 2023-07-07 | End: 2023-07-07 | Stop reason: HOSPADM

## 2023-07-07 RX ADMIN — TRIAMCINOLONE ACETONIDE 40 MG: 40 INJECTION, SUSPENSION INTRA-ARTICULAR; INTRAMUSCULAR at 08:07

## 2023-07-07 NOTE — PROGRESS NOTES
SUBJECTIVE:      Chief Complaint: Injury, Pain, and Numbness of the Left Hand    Referring Provider: Malik Kiser MD     History of Present Illness:  Patient is a 58 y.o. right hand dominant female who presents today with complaints of L ring finger pain and injury.   Onset of symptoms was about 3 weeks ago. States that she was letting her goats out the gate and it slammed up against her hand. The hand did swell up at the time but she denies bruising. She saw her PCP last week, xrays were normal. Today pain is 5/10, intermittent, aching. The location of the pain is at the volar MCP area of the left ring finger. Pain is worst at nighttime and will affect her sleep. She states that her range of motion in the finger is limited. She takes ibuprofen as needed for pain. The patient denies any fevers, chills, N/V, D/C and presents for evaluation.    Past Medical History:   Diagnosis Date    Crohn's colitis      Past Surgical History:   Procedure Laterality Date    ABDOMINAL SURGERY      bladder tuck      CHOLECYSTECTOMY      COLOSTOMY      COLOSTOMY CLOSURE      HERNIA REPAIR      HYSTERECTOMY      OOPHORECTOMY      TONSILLECTOMY       Review of patient's allergies indicates:   Allergen Reactions    Codeine Hives and Nausea Only     Social History     Social History Narrative    Not on file     Family History   Problem Relation Age of Onset    Arthritis Mother     COPD Mother     Heart disease Mother     Hyperlipidemia Mother     Arthritis Father     COPD Father     Heart disease Father     Hypertension Father     Vision loss Father     Cancer Neg Hx          Current Outpatient Medications:     butalbital-acetaminophen-caffeine -40 mg (FIORICET, ESGIC) -40 mg per tablet, Take 1 tablet by mouth every 4 (four) hours as needed for Pain., Disp: 30 tablet, Rfl: 0    fluticasone propionate (FLONASE) 50 mcg/actuation nasal spray, 1 spray (50 mcg total) by Each Nostril route once daily., Disp: 18.2 mL, Rfl: 1    " lisinopriL 10 MG tablet, Take 1 tablet (10 mg total) by mouth once daily., Disp: 90 tablet, Rfl: 1      Review of Systems:  Constitutional: Negative for chills and fever.   Respiratory: Negative for cough and shortness of breath.    Gastrointestinal: Negative for nausea and vomiting.   Skin: Negative for rash.   Neurological: Negative for dizziness and headaches.   Psychiatric/Behavioral: Negative for depression.   MSK as in HPI     OBJECTIVE:      Vital Signs (Most Recent):  Vitals:    07/07/23 0813   Weight: 71.6 kg (157 lb 13.6 oz)   Height: 5' 2" (1.575 m)     Body mass index is 28.87 kg/m².      Physical Exam:  Constitutional: The patient appears well-developed and well-nourished. No distress.   Skin: No lesions appreciated  Head: Normocephalic and atraumatic.   Nose: Nose normal.   Ears: No deformities seen  Eyes: Conjunctivae and EOM are normal.   Neck: No tracheal deviation present.   Cardiovascular: Normal rate and intact distal pulses.    Pulmonary/Chest: Effort normal. No respiratory distress.   Abdominal: There is no guarding.   Neurological: The patient is alert.   Psychiatric: The patient has a normal mood and affect.     General    Vitals reviewed.            Right Hand/Wrist Exam     Other     Neuorologic Exam    Median Distribution: normal  Ulnar Distribution: normal  Radial Distribution: normal      Left Hand/Wrist Exam     Inspection   Scars: Wrist - absent Hand -  absent  Effusion: Wrist - absent Hand -  absent    Pain   Hand - The patient exhibits pain of the ring MCP.    Other     Sensory Exam  Median Distribution: normal  Ulnar Distribution: normal  Radial Distribution: normal    Comments:  Mild swelling to dorsal ring finger MCP area compared to R side  TTP at ring finger MCP volarly  Able to flex at DIP, PIP, MCP with mild pain  Able to touch pulp to palm ring finger  No active triggering in clinic  She is NVI  No motor or sensory deficits  No signs of infection  No visible " deformity          Vascular Exam       Capillary Refill  Right Hand: normal capillary refill  Left Hand: normal capillary refill        Diagnostic Results:  EXAMINATION:  XR HAND COMPLETE 3 VIEW LEFT     CLINICAL HISTORY:  . Pain in left finger(s)     TECHNIQUE:  AP,lateral, and oblique views of the left hand were performed.     COMPARISON:  None     FINDINGS:  There is no radiographic evidence of acute osseous, articular, or soft tissue abnormality.  Joint spaces are preserved.  No erosive changes demonstrated.     Impression:     No acute findings        Electronically signed by: Alcides Lubin MD  Date:                                            06/26/2023  Time:                                           15:38     Imaging - I independently viewed the patient's imaging as well as the radiology report.  Xrays of the patient's left hand demonstrate no evidence of any acute fractures or dislocations or significant degenerative changes.    ASSESSMENT/PLAN:        ICD-10-CM ICD-9-CM   1. Injury of left ring finger, initial encounter  S69.92XA 959.5   2. Finger pain, left  M79.645 729.5     Orders Placed This Encounter    Tendon Sheath    Ambulatory referral/consult to Physical/Occupational Therapy     Orders Placed This Encounter   Procedures    Tendon Sheath    Ambulatory referral/consult to Physical/Occupational Therapy     Plan:     - xrays reviewed and discussed with patient, no acute abnormalities  - recommend OT evaluation to help with L ring finger ROM and pain -- referral sent to [facility]  - L ring finger MCP injection today. Patient must wait at least 3 months between injections  - f/u PRN / if symptoms worsen or persist    PROCEDURE NOTE:  I have explained the risks, benefits, and alternatives of the procedure in detail.  The patient voices understanding and all questions have been answered, consents to injection. Pause for timeout.  After a sterile prep of the skin in the normal fashion, the volar  MCP of the left ring finger is injected using a 25 gauge needle from the volar approach with a combination of 0.5 cc 2% plain Lidocaine and 0.5 cc kenalog.  The patient is cautioned and immediate relief of pain is secondary to the local anesthetic and will be temporary.  After the anesthetic wears off there may be a increase in pain that may last for a few hours or a few days and they should use ice to help alleviate this flare up of pain.       Should the patient's symptoms worsen, persist, or fail to improve they should return for reevaluation and I would be happy to see them back anytime.        Lorie Victoria PA-C   [facility]     Please be aware that this note has been generated with the assistance of [employer] voice-to-text.  Please excuse any spelling or grammatical errors.

## 2023-07-07 NOTE — PROCEDURES
Tendon Sheath    Date/Time: 7/7/2023 8:30 AM  Performed by: Lorie Victoria PA-C  Authorized by: Lorie Victoria PA-C     Consent Done?:  Yes (Verbal)  Indications:  Pain  Site marked: the procedure site was marked    Timeout: prior to procedure the correct patient, procedure, and site was verified    Prep: patient was prepped and draped in usual sterile fashion      Local anesthesia used?: Yes    Local anesthetic:  Lidocaine 2% without epinephrine  Anesthetic total (ml):  0.5    Location:  Ring finger  Site:  L ring MCP  Ultrasonic guidance for needle placement?: No    Needle size:  25 G  Approach:  Volar  Medications:  40 mg triamcinolone acetonide 40 mg/mL  Patient tolerance:  Patient tolerated the procedure well with no immediate complications

## 2023-09-21 RX ORDER — LISINOPRIL 10 MG/1
10 TABLET ORAL DAILY
Qty: 90 TABLET | Refills: 3 | Status: SHIPPED | OUTPATIENT
Start: 2023-09-21

## 2023-09-21 NOTE — TELEPHONE ENCOUNTER
Refill Decision Note   Kamilla Leavitt  is requesting a refill authorization.  Brief Assessment and Rationale for Refill:  Approve     Medication Therapy Plan:         Comments:     Note composed:10:10 AM 09/21/2023             Appointments     Last Visit   6/26/2023 Malik Kiser MD   Next Visit   Visit date not found Malik Kiser MD

## 2023-09-21 NOTE — TELEPHONE ENCOUNTER
No care due was identified.  [facility] Embedded Care Due Messages. Reference number: [id number].   9/21/2023 2:04:03 AM CDT

## 2023-10-04 ENCOUNTER — PATIENT MESSAGE (OUTPATIENT)
Dept: FAMILY MEDICINE | Facility: CLINIC | Age: 58
End: 2023-10-04
Payer: COMMERCIAL

## 2023-10-09 ENCOUNTER — OFFICE VISIT (OUTPATIENT)
Dept: FAMILY MEDICINE | Facility: CLINIC | Age: 58
End: 2023-10-09
Payer: COMMERCIAL

## 2023-10-09 VITALS
BODY MASS INDEX: 28.48 KG/M2 | TEMPERATURE: 98 F | HEIGHT: 62 IN | SYSTOLIC BLOOD PRESSURE: 122 MMHG | OXYGEN SATURATION: 96 % | WEIGHT: 154.75 LBS | DIASTOLIC BLOOD PRESSURE: 71 MMHG | HEART RATE: 90 BPM

## 2023-10-09 DIAGNOSIS — G43.809 OTHER MIGRAINE WITHOUT STATUS MIGRAINOSUS, NOT INTRACTABLE: ICD-10-CM

## 2023-10-09 DIAGNOSIS — R73.03 PREDIABETES: ICD-10-CM

## 2023-10-09 DIAGNOSIS — K50.919 CROHN'S DISEASE WITH COMPLICATION, UNSPECIFIED GASTROINTESTINAL TRACT LOCATION: ICD-10-CM

## 2023-10-09 DIAGNOSIS — E78.2 MIXED HYPERLIPIDEMIA: ICD-10-CM

## 2023-10-09 DIAGNOSIS — I10 PRIMARY HYPERTENSION: Primary | ICD-10-CM

## 2023-10-09 DIAGNOSIS — K63.5 POLYP OF COLON, UNSPECIFIED PART OF COLON, UNSPECIFIED TYPE: ICD-10-CM

## 2023-10-09 DIAGNOSIS — Z12.39 ENCOUNTER FOR SCREENING FOR MALIGNANT NEOPLASM OF BREAST, UNSPECIFIED SCREENING MODALITY: ICD-10-CM

## 2023-10-09 PROCEDURE — 99214 OFFICE O/P EST MOD 30 MIN: CPT | Mod: S$GLB,,, | Performed by: FAMILY MEDICINE

## 2023-10-09 PROCEDURE — 99999 PR PBB SHADOW E&M-EST. PATIENT-LVL III: ICD-10-PCS | Mod: PBBFAC,,, | Performed by: FAMILY MEDICINE

## 2023-10-09 PROCEDURE — 99999 PR PBB SHADOW E&M-EST. PATIENT-LVL III: CPT | Mod: PBBFAC,,, | Performed by: FAMILY MEDICINE

## 2023-10-09 PROCEDURE — 99214 PR OFFICE/OUTPT VISIT, EST, LEVL IV, 30-39 MIN: ICD-10-PCS | Mod: S$GLB,,, | Performed by: FAMILY MEDICINE

## 2023-10-09 RX ORDER — PROPRANOLOL HYDROCHLORIDE 10 MG/1
10 TABLET ORAL 2 TIMES DAILY
Qty: 60 TABLET | Refills: 1 | Status: SHIPPED | OUTPATIENT
Start: 2023-10-09 | End: 2024-10-08

## 2023-10-09 NOTE — PROGRESS NOTES
"Subjective:       Patient ID: Kamilla Leavitt is a 58 y.o. female.    Chief Complaint: Hypertension      HPI Comments:       Current Outpatient Medications:     butalbital-acetaminophen-caffeine -40 mg (FIORICET, ESGIC) -40 mg per tablet, Take 1 tablet by mouth every 4 (four) hours as needed for Pain., Disp: 30 tablet, Rfl: 0    fluticasone propionate (FLONASE) 50 mcg/actuation nasal spray, 1 spray (50 mcg total) by Each Nostril route once daily., Disp: 18.2 mL, Rfl: 1    lisinopriL 10 MG tablet, Take 1 tablet (10 mg total) by mouth once daily., Disp: 90 tablet, Rfl: 3    propranoloL (INDERAL) 10 MG tablet, Take 1 tablet (10 mg total) by mouth 2 (two) times daily., Disp: 60 tablet, Rfl: 1      At times her diastolic blood pressure is elevated and she often will developed a migraine headache.  This has been happening fairly regularly lately.    Still has not scheduled colonoscopy.  Will do so soon.  Also ready to schedule her mammogram      Review of Systems   Constitutional:  Negative for activity change, appetite change and fever.   HENT:  Negative for sore throat.    Respiratory:  Negative for cough and shortness of breath.    Cardiovascular:  Negative for chest pain.   Gastrointestinal:  Negative for abdominal pain, diarrhea and nausea.   Genitourinary:  Negative for difficulty urinating.   Musculoskeletal:  Negative for arthralgias and myalgias.   Neurological:  Positive for headaches. Negative for dizziness.       Objective:      Vitals:    10/09/23 1408   BP: 122/71   Pulse: 90   Temp: 98.2 °F (36.8 °C)   TempSrc: Tympanic   SpO2: 96%   Weight: 70.2 kg (154 lb 12.2 oz)   Height: 5' 2" (1.575 m)     Physical Exam  Vitals and nursing note reviewed.   Constitutional:       General: She is not in acute distress.     Appearance: She is well-developed. She is not diaphoretic.   HENT:      Head: Normocephalic.   Neck:      Thyroid: No thyromegaly.   Cardiovascular:      Rate and Rhythm: Normal rate and " regular rhythm.      Heart sounds: Normal heart sounds. No murmur heard.  Pulmonary:      Effort: Pulmonary effort is normal.      Breath sounds: Normal breath sounds. No wheezing or rales.   Abdominal:      General: There is no distension.      Palpations: Abdomen is soft.   Musculoskeletal:      Cervical back: Neck supple.   Lymphadenopathy:      Cervical: No cervical adenopathy.   Skin:     General: Skin is warm and dry.   Neurological:      Mental Status: She is alert and oriented to person, place, and time.   Psychiatric:         Mood and Affect: Mood normal.         Behavior: Behavior normal.         Thought Content: Thought content normal.         Judgment: Judgment normal.         Assessment:       1. Primary hypertension    2. Prediabetes    3. Mixed hyperlipidemia    4. Other migraine without status migrainosus, not intractable    5. Crohn's disease with complication, unspecified gastrointestinal tract location    6. Polyp of colon, unspecified part of colon, unspecified type    7. Encounter for screening for malignant neoplasm of breast, unspecified screening modality        Plan:   Primary hypertension  Comments:  Some labile elevations.  Start low-dose beta-blocker.  Follow-up 6 months or sooner if needed    Prediabetes  Comments:  A1c 5.6    Mixed hyperlipidemia  Comments:      Other migraine without status migrainosus, not intractable  Comments:  Add Inderal low-dose b.i.d.    Crohn's disease with complication, unspecified gastrointestinal tract location  Comments:  Will schedule colonoscopy soon    Polyp of colon, unspecified part of colon, unspecified type  Comments:  Will schedule colonoscopy soon    Encounter for screening for malignant neoplasm of breast, unspecified screening modality  Comments:  Mammogram ordered.  Patient will schedule  Orders:  -     Mammo Digital Screening Bilat w/ Jeb; Future; Expected date: 10/09/2023    Other orders  -     propranoloL (INDERAL) 10 MG tablet; Take  1 tablet (10 mg total) by mouth 2 (two) times daily.  Dispense: 60 tablet; Refill: 1

## 2023-10-21 ENCOUNTER — PATIENT MESSAGE (OUTPATIENT)
Dept: FAMILY MEDICINE | Facility: CLINIC | Age: 58
End: 2023-10-21
Payer: COMMERCIAL

## 2023-11-03 ENCOUNTER — PATIENT MESSAGE (OUTPATIENT)
Dept: FAMILY MEDICINE | Facility: CLINIC | Age: 58
End: 2023-11-03
Payer: COMMERCIAL

## 2023-11-04 ENCOUNTER — PATIENT MESSAGE (OUTPATIENT)
Dept: FAMILY MEDICINE | Facility: CLINIC | Age: 58
End: 2023-11-04
Payer: COMMERCIAL

## 2023-11-05 ENCOUNTER — PATIENT MESSAGE (OUTPATIENT)
Dept: FAMILY MEDICINE | Facility: CLINIC | Age: 58
End: 2023-11-05
Payer: COMMERCIAL

## 2023-11-30 ENCOUNTER — NURSE TRIAGE (OUTPATIENT)
Dept: ADMINISTRATIVE | Facility: CLINIC | Age: 58
End: 2023-11-30
Payer: COMMERCIAL

## 2023-11-30 ENCOUNTER — HOSPITAL ENCOUNTER (EMERGENCY)
Facility: HOSPITAL | Age: 58
Discharge: HOME | End: 2023-12-01
Attending: EMERGENCY MEDICINE
Payer: COMMERCIAL

## 2023-11-30 DIAGNOSIS — R06.02 SHORTNESS OF BREATH: ICD-10-CM

## 2023-11-30 DIAGNOSIS — J01.10 ACUTE NON-RECURRENT FRONTAL SINUSITIS: Primary | ICD-10-CM

## 2023-11-30 PROCEDURE — U0002 COVID-19 LAB TEST NON-CDC: HCPCS | Performed by: NURSE PRACTITIONER

## 2023-11-30 PROCEDURE — 87502 INFLUENZA DNA AMP PROBE: CPT | Performed by: NURSE PRACTITIONER

## 2023-11-30 PROCEDURE — 99284 EMERGENCY DEPT VISIT MOD MDM: CPT | Mod: 25

## 2023-11-30 NOTE — Clinical Note
"Kamilla"Dena Leavitt was seen and treated in our emergency department on 11/30/2023.  She may return to work on 12/04/2023.       If you have any questions or concerns, please don't hesitate to call.      Nurys LINDSEY    "

## 2023-12-01 VITALS
WEIGHT: 158.5 LBS | HEIGHT: 62 IN | SYSTOLIC BLOOD PRESSURE: 111 MMHG | HEART RATE: 94 BPM | RESPIRATION RATE: 20 BRPM | OXYGEN SATURATION: 95 % | BODY MASS INDEX: 29.17 KG/M2 | DIASTOLIC BLOOD PRESSURE: 65 MMHG | TEMPERATURE: 99 F

## 2023-12-01 LAB
INFLUENZA A, MOLECULAR: NEGATIVE
INFLUENZA B, MOLECULAR: NEGATIVE
SARS-COV-2 RDRP RESP QL NAA+PROBE: NEGATIVE
SPECIMEN SOURCE: NORMAL

## 2023-12-01 PROCEDURE — 25000003 PHARM REV CODE 250: Performed by: NURSE PRACTITIONER

## 2023-12-01 RX ORDER — ONDANSETRON 4 MG/1
4 TABLET, ORALLY DISINTEGRATING ORAL EVERY 6 HOURS PRN
Qty: 20 TABLET | Refills: 0 | Status: SHIPPED | OUTPATIENT
Start: 2023-12-01

## 2023-12-01 RX ORDER — IBUPROFEN 800 MG/1
800 TABLET ORAL
Status: COMPLETED | OUTPATIENT
Start: 2023-12-01 | End: 2023-12-01

## 2023-12-01 RX ORDER — AMOXICILLIN AND CLAVULANATE POTASSIUM 875; 125 MG/1; MG/1
1 TABLET, FILM COATED ORAL 2 TIMES DAILY
Qty: 14 TABLET | Refills: 0 | Status: SHIPPED | OUTPATIENT
Start: 2023-12-01

## 2023-12-01 RX ORDER — ONDANSETRON 4 MG/1
4 TABLET, ORALLY DISINTEGRATING ORAL
Status: COMPLETED | OUTPATIENT
Start: 2023-12-01 | End: 2023-12-01

## 2023-12-01 RX ORDER — ALBUTEROL SULFATE 90 UG/1
1-2 AEROSOL, METERED RESPIRATORY (INHALATION) EVERY 6 HOURS PRN
Qty: 8 G | Refills: 0 | Status: SHIPPED | OUTPATIENT
Start: 2023-12-01 | End: 2024-11-30

## 2023-12-01 RX ORDER — IBUPROFEN 800 MG/1
800 TABLET ORAL EVERY 6 HOURS PRN
Qty: 20 TABLET | Refills: 0 | Status: SHIPPED | OUTPATIENT
Start: 2023-12-01

## 2023-12-01 RX ADMIN — IBUPROFEN 800 MG: 800 TABLET, FILM COATED ORAL at 01:12

## 2023-12-01 RX ADMIN — ONDANSETRON 4 MG: 4 TABLET, ORALLY DISINTEGRATING ORAL at 01:12

## 2023-12-01 NOTE — TELEPHONE ENCOUNTER
Pt reports chills/sweats w/ nausea, cough, body aches and SOB at rest and with ambulating. Advised, per protocol. Verbalizes understanding.     Reason for Disposition   MODERATE difficulty breathing (e.g., speaks in phrases, SOB even at rest, pulse 100-120)    Additional Information   Negative: SEVERE difficulty breathing (e.g., struggling for each breath, speaks in single words)   Negative: Difficult to awaken or acting confused (e.g., disoriented, slurred speech)   Negative: Bluish (or gray) lips or face now   Negative: Shock suspected (e.g., cold/pale/clammy skin, too weak to stand, low BP, rapid pulse)   Negative: Sounds like a life-threatening emergency to the triager    Protocols used: Coronavirus (COVID-19) Diagnosed or [id number]

## 2023-12-01 NOTE — ED PROVIDER NOTES
Encounter Date: 11/30/2023       History     Chief Complaint   Patient presents with    Chills     Pt presents to ED [place] fever/chills, HA, gen body aches, N/V/D, non-productive cough. Hx Crohn's. See at  Tuesday and dx with sinus infection. Pt on abx currently.     Patient is a 58-year-old female that presents with complaints of fever, chills, headache, body aches, nausea and vomiting.  Onset of symptoms was 4 days ago.  Patient was diagnosed with a sinus infection 2 days ago and reports no relief with medications prescribed.  Patient reports difficulty breathing due to cough.  Patient shows no signs distress at this time.      Review of patient's allergies indicates:   Allergen Reactions    Codeine Hives and Nausea Only     Past Medical History:   Diagnosis Date    Crohn's colitis      Past Surgical History:   Procedure Laterality Date    ABDOMINAL SURGERY      bladder tuck      CHOLECYSTECTOMY      COLOSTOMY      COLOSTOMY CLOSURE      HERNIA REPAIR      HYSTERECTOMY      OOPHORECTOMY      TONSILLECTOMY       Family History   Problem Relation Age of Onset    Arthritis Mother     COPD Mother     Heart disease Mother     Hyperlipidemia Mother     Arthritis Father     COPD Father     Heart disease Father     Hypertension Father     Vision loss Father     Cancer Neg Hx      Social History     Tobacco Use    Smoking status: Former    Smokeless tobacco: Never   Substance Use Topics    Alcohol use: No    Drug use: No     Review of Systems   Constitutional:  Positive for chills and fever.   HENT:  Positive for congestion. Negative for sore throat.    Respiratory:  Positive for cough and shortness of breath.    Cardiovascular:  Negative for chest pain.   Gastrointestinal:  Positive for nausea and vomiting.   Genitourinary:  Negative for dysuria.   Musculoskeletal:  Positive for myalgias. Negative for back pain.   Skin:  Negative for rash.   Neurological:  Positive for headaches. Negative for weakness.   Hematological:   Does not bruise/bleed easily.       Physical Exam     Initial Vitals [11/30/23 2331]   BP Pulse Resp Temp SpO2   (!) 133/57 105 20 99.1 °F (37.3 °C) 97 %      MAP       --         Physical Exam    Nursing note and vitals reviewed.  Constitutional: She appears well-developed and well-nourished. She appears ill.   HENT:   Head: Normocephalic and atraumatic.   Eyes: EOM are normal. Pupils are equal, round, and reactive to light.   Neck: Neck supple.   Normal range of motion.  Cardiovascular:  Regular rhythm, normal heart sounds and intact distal pulses.   Tachycardia present.         Pulmonary/Chest: Breath sounds normal.   Abdominal: Abdomen is soft. Bowel sounds are normal.   Musculoskeletal:         General: Normal range of motion.      Cervical back: Normal range of motion and neck supple.     Neurological: She is alert and oriented to person, place, and time. She has normal strength and normal reflexes.   Skin: Skin is warm and dry.         ED Course   Procedures  Labs Reviewed   INFLUENZA A & B BY MOLECULAR   SARS-COV-2 RNA AMPLIFICATION, QUAL   HIV 1 / 2 ANTIBODY   HEPATITIS C ANTIBODY   HEP C VIRUS HOLD SPECIMEN          Imaging Results              X-Ray Chest 1 View (In process)                      Medications   ondansetron disintegrating tablet 4 mg (has no administration in time range)   ibuprofen tablet 800 mg (has no administration in time range)     Medical Decision Making  Patient informed of lab and imaging results.  Patient instructed take antibiotics as prescribed and follow-up with the primary care physician.  Patient to return to the emergency room with any worsening symptoms.  Patient verbalized understanding and agrees to plan.    Amount and/or Complexity of Data Reviewed  Radiology: ordered.    Risk  Prescription drug management.                                      Clinical Impression:  Final diagnoses:  [R06.02] Shortness of breath  [J01.10] Acute non-recurrent frontal sinusitis (Primary)           ED Disposition Condition    Discharge Stable          ED Prescriptions       Medication Sig Dispense Start Date End Date Auth. Provider    amoxicillin-clavulanate 875-125mg (AUGMENTIN) 875-125 mg per tablet Take 1 tablet by mouth 2 (two) times daily. 14 tablet 12/1/2023 -- Addi Lindquist NP    ondansetron (ZOFRAN-ODT) 4 MG TbDL Take 1 tablet (4 mg total) by mouth every 6 (six) hours as needed (Nausea). 20 tablet 12/1/2023 -- Addi Lindquist NP    ibuprofen (ADVIL,MOTRIN) 800 MG tablet Take 1 tablet (800 mg total) by mouth every 6 (six) hours as needed for Pain. 20 tablet 12/1/2023 -- Addi Lindquist NP    albuterol (PROVENTIL/VENTOLIN HFA) 90 mcg/actuation inhaler Inhale 1-2 puffs into the lungs every 6 (six) hours as needed for Wheezing. Rescue 8 g 12/1/2023 11/30/2024 Addi Lindquist NP          Follow-up Information       Follow up With Specialties Details Why Contact Info    Malik Kiser MD Family Medicine  As needed [address] [facility] [address]  [phone]               Addi Lindquist NP  12/01/23 0128

## 2024-07-03 DIAGNOSIS — I10 PRIMARY HYPERTENSION: ICD-10-CM

## 2024-09-18 DIAGNOSIS — R73.03 PREDIABETES: ICD-10-CM

## 2024-10-15 ENCOUNTER — TELEPHONE (OUTPATIENT)
Dept: FAMILY MEDICINE | Facility: CLINIC | Age: 59
End: 2024-10-15
Payer: COMMERCIAL

## 2024-10-15 NOTE — TELEPHONE ENCOUNTER
LM letting patient know she needs to be seen in order to get her Lisinopril refilled. She has not been seen in over a year and we need to get some vitals on her

## 2024-10-15 NOTE — TELEPHONE ENCOUNTER
----- Message from Kismet sent at 10/15/2024  2:06 PM CDT -----  Contact: [facility]  ..Type:  RX Refill Request    Who Called:  Sara   Refill or New Rx:Refill   RX Name and Strength:lisinopriL 10 MG tablet  How is the patient currently taking it? (ex. 1XDay):1xday   Is this a 30 day or 90 day RX:90  Preferred Pharmacy with phone number:.  [facility]   Phone number: [phone] Fax number: [phone]   Local or Mail Order:mail   Ordering Provider:Rashel   Would the patient rather a call back or a response via [facility]? Call back   Best Call Back Number:.[phone]   Additional Information:

## 2024-10-21 ENCOUNTER — OFFICE VISIT (OUTPATIENT)
Dept: FAMILY MEDICINE | Facility: CLINIC | Age: 59
End: 2024-10-21
Payer: COMMERCIAL

## 2024-10-21 ENCOUNTER — LAB VISIT (OUTPATIENT)
Dept: LAB | Facility: HOSPITAL | Age: 59
End: 2024-10-21
Attending: FAMILY MEDICINE
Payer: COMMERCIAL

## 2024-10-21 VITALS
WEIGHT: 160.25 LBS | TEMPERATURE: 98 F | DIASTOLIC BLOOD PRESSURE: 70 MMHG | SYSTOLIC BLOOD PRESSURE: 110 MMHG | BODY MASS INDEX: 29.31 KG/M2 | HEART RATE: 94 BPM | OXYGEN SATURATION: 98 %

## 2024-10-21 DIAGNOSIS — E78.2 MIXED HYPERLIPIDEMIA: ICD-10-CM

## 2024-10-21 DIAGNOSIS — R73.03 PREDIABETES: ICD-10-CM

## 2024-10-21 DIAGNOSIS — I10 PRIMARY HYPERTENSION: Primary | ICD-10-CM

## 2024-10-21 DIAGNOSIS — K50.919 CROHN'S DISEASE WITH COMPLICATION, UNSPECIFIED GASTROINTESTINAL TRACT LOCATION: ICD-10-CM

## 2024-10-21 DIAGNOSIS — K63.5 POLYP OF COLON, UNSPECIFIED PART OF COLON, UNSPECIFIED TYPE: ICD-10-CM

## 2024-10-21 DIAGNOSIS — G43.809 OTHER MIGRAINE WITHOUT STATUS MIGRAINOSUS, NOT INTRACTABLE: ICD-10-CM

## 2024-10-21 LAB
ALBUMIN SERPL BCP-MCNC: 3.9 G/DL (ref 3.5–5.2)
ALP SERPL-CCNC: 67 U/L (ref 40–150)
ALT SERPL W/O P-5'-P-CCNC: 17 U/L (ref 10–44)
ANION GAP SERPL CALC-SCNC: 9 MMOL/L (ref 8–16)
AST SERPL-CCNC: 14 U/L (ref 10–40)
BILIRUB SERPL-MCNC: 0.3 MG/DL (ref 0.1–1)
BUN SERPL-MCNC: 19 MG/DL (ref 6–20)
CALCIUM SERPL-MCNC: 10.2 MG/DL (ref 8.7–10.5)
CHLORIDE SERPL-SCNC: 106 MMOL/L (ref 95–110)
CO2 SERPL-SCNC: 26 MMOL/L (ref 23–29)
CREAT SERPL-MCNC: 0.8 MG/DL (ref 0.5–1.4)
EST. GFR  (NO RACE VARIABLE): >60 ML/MIN/1.73 M^2
ESTIMATED AVG GLUCOSE: 117 MG/DL (ref 68–131)
GLUCOSE SERPL-MCNC: 91 MG/DL (ref 70–110)
HBA1C MFR BLD: 5.7 % (ref 4–5.6)
POTASSIUM SERPL-SCNC: 4 MMOL/L (ref 3.5–5.1)
PROT SERPL-MCNC: 7.1 G/DL (ref 6–8.4)
SODIUM SERPL-SCNC: 141 MMOL/L (ref 136–145)

## 2024-10-21 PROCEDURE — 83036 HEMOGLOBIN GLYCOSYLATED A1C: CPT | Performed by: FAMILY MEDICINE

## 2024-10-21 PROCEDURE — G2211 COMPLEX E/M VISIT ADD ON: HCPCS | Mod: S$GLB,,, | Performed by: FAMILY MEDICINE

## 2024-10-21 PROCEDURE — 36415 COLL VENOUS BLD VENIPUNCTURE: CPT | Mod: PO | Performed by: FAMILY MEDICINE

## 2024-10-21 PROCEDURE — 80053 COMPREHEN METABOLIC PANEL: CPT | Performed by: FAMILY MEDICINE

## 2024-10-21 PROCEDURE — 99214 OFFICE O/P EST MOD 30 MIN: CPT | Mod: S$GLB,,, | Performed by: FAMILY MEDICINE

## 2024-10-21 PROCEDURE — 99999 PR PBB SHADOW E&M-EST. PATIENT-LVL III: CPT | Mod: PBBFAC,,, | Performed by: FAMILY MEDICINE

## 2024-10-21 RX ORDER — AMITRIPTYLINE HYDROCHLORIDE 25 MG/1
TABLET, FILM COATED ORAL
COMMUNITY
Start: 2024-08-02

## 2024-10-21 RX ORDER — LISINOPRIL 10 MG/1
10 TABLET ORAL DAILY
Qty: 90 TABLET | Refills: 3 | Status: SHIPPED | OUTPATIENT
Start: 2024-10-21

## 2024-10-21 NOTE — PROGRESS NOTES
Subjective:       Patient ID: Kamilla Leavitt is a 59 y.o. female.    Chief Complaint: Annual Exam      HPI Comments:       Current Outpatient Medications:     amitriptyline (ELAVIL) 25 MG tablet, TAKE 2 TABLETS BY MOUTH ONCE DAILY AT BEDTIME, Disp: , Rfl:     butalbital-acetaminophen-caffeine -40 mg (FIORICET, ESGIC) -40 mg per tablet, Take 1 tablet by mouth every 4 (four) hours as needed for Pain., Disp: 30 tablet, Rfl: 0    ibuprofen (ADVIL,MOTRIN) 800 MG tablet, Take 1 tablet (800 mg total) by mouth every 6 (six) hours as needed for Pain., Disp: 20 tablet, Rfl: 0    lisinopriL 10 MG tablet, Take 1 tablet (10 mg total) by mouth once daily., Disp: 90 tablet, Rfl: 3      Last seen a year ago.  Since that time she has seen a neurologist, in his treated by her nurse practitioner (Melida) with Elavil.  Currently taking 12.5 mg to 25 mg nightly.  Working well for both sleep and headache prophylaxis.    I have put her on a beta-blocker a year ago.  This was not effective for her headaches.    Colonoscopy got sidetracked by a bladder procedure at [facility] that was planned.  They were going to do a colonoscopy at the same time.  Insurance problems cause the whole matter to be postpone.  Have reordered colonoscopy to be done here now.  Her urologist is Dr. Shansk    Mammogram is also due.  Patient will schedule that herself.      Review of Systems   Constitutional:  Negative for activity change, appetite change and fever.   HENT:  Negative for sore throat.    Respiratory:  Negative for cough and shortness of breath.    Cardiovascular:  Negative for chest pain.   Gastrointestinal:  Negative for abdominal pain, diarrhea and nausea.   Genitourinary:  Negative for difficulty urinating.   Musculoskeletal:  Negative for arthralgias and myalgias.   Neurological:  Negative for dizziness and headaches.       Objective:      Vitals:    10/21/24 1448   BP: 110/70   Pulse: 94   Temp: 98 °F (36.7 °C)   SpO2: 98%   Weight:  72.7 kg (160 lb 4.4 oz)   PainSc: 0-No pain     Physical Exam  Vitals and nursing note reviewed.   Constitutional:       General: She is not in acute distress.     Appearance: She is well-developed. She is not diaphoretic.   HENT:      Head: Normocephalic.   Neck:      Thyroid: No thyromegaly.   Cardiovascular:      Rate and Rhythm: Normal rate and regular rhythm.      Heart sounds: Normal heart sounds. No murmur heard.  Pulmonary:      Effort: Pulmonary effort is normal.      Breath sounds: Normal breath sounds. No wheezing or rales.   Abdominal:      General: There is no distension.      Palpations: Abdomen is soft.   Musculoskeletal:      Cervical back: Neck supple.   Lymphadenopathy:      Cervical: No cervical adenopathy.   Skin:     General: Skin is warm and dry.   Neurological:      Mental Status: She is alert and oriented to person, place, and time.   Psychiatric:         Mood and Affect: Mood normal.         Behavior: Behavior normal.         Thought Content: Thought content normal.         Judgment: Judgment normal.         Assessment:       1. Primary hypertension    2. Prediabetes    3. Other migraine without status migrainosus, not intractable    4. Mixed hyperlipidemia    5. Crohn's disease with complication, unspecified gastrointestinal tract location    6. Polyp of colon, unspecified part of colon, unspecified type        Plan:   Primary hypertension  Comments:  Controlled.  Refill lisinopril.  Follow-up 6 months    Prediabetes  Comments:  A1c today  Orders:  -     Hemoglobin A1C; Future; Expected date: 10/21/2024  -     Comprehensive Metabolic Panel; Future; Expected date: 10/21/2024    Other migraine without status migrainosus, not intractable  Comments:  Doing well with TCA at bedtime.  Followed by Neurology    Mixed hyperlipidemia  Comments:      Crohn's disease with complication, unspecified gastrointestinal tract location  Comments:  Colonoscopy reordered    Polyp of colon, unspecified  part of colon, unspecified type  Comments:  Colonoscopy reordered  Orders:  -     Ambulatory referral/consult to Endo Procedure ; Future; Expected date: 10/22/2024    Other orders  -     lisinopriL 10 MG tablet; Take 1 tablet (10 mg total) by mouth once daily.  Dispense: 90 tablet; Refill: 3

## 2024-10-23 ENCOUNTER — PATIENT MESSAGE (OUTPATIENT)
Dept: FAMILY MEDICINE | Facility: CLINIC | Age: 59
End: 2024-10-23
Payer: COMMERCIAL

## 2024-11-20 NOTE — PROGRESS NOTES
Copied from CRM #[id number]. Topic: MW Messaging - MW Patient Request  >> Nov 20, 2024 12:13 PM Rajendra WYATT wrote:  Mercedes [facility] called during clinic working hours to follow up on previous message from the clinic.     Returning call NOT on same day OR no communication history located for that call. Open encounter located.     Selected 'Wrap Up CRM' and chose appropriate open Telephone Encounter after clicking 'Convert to Clinical Call'.  Completed appropriate message template and routed as routine priority per Clinician KB page to appropriate clinician pool. Readback full message.-- DO NOT REPLY / DO NOT REPLY ALL --  -- This inbox is not monitored. If this was sent to the wrong provider or department, reroute message to P ECO Reroute pool. --  -- Message is from Engagement Center Operations (ECO) --    General Patient Message: Patient returned phone call regarding Case Management to say that she is not interested in having a .   Caller Information       Contact Date/Time Type Contact Phone/Fax    11/20/2024 12:08 PM CST Phone (Incoming) Mercedes Hills [phone]     Returning a call regarding test results.    11/20/2024 12:14 PM CST Phone (Incoming) Mercedes Hills (Self) [phone] (M)            Alternative phone number: no    Can a detailed message be left? Yes - LiveWell Message  and Yes - Voicemail   Patient has been advised the message will be addressed within 2-3 business days.                 Subjective:       Patient ID: Kamilla Leavitt is a 53 y.o. female.    Chief Complaint:Sinusitis    Sinusitis: Patient presents with acute sinusitis for one day. Rapidly worsening.  Her symptoms include nasal congestion, purulent rhinorrhea, cough, sniffing, sore throats, mouthbreathing, malaise, headaches, facial pain, puffiness of the eyes, ear ache.  There has not been a history of chronic allergies. She missed work today.      Blood pressure was found to be elevated in the office today, she does not have any history of hypertension and not on any medication.  History reviewed. No pertinent past medical history.  Family History   Problem Relation Age of Onset    Arthritis Mother     COPD Mother     Heart disease Mother     Hyperlipidemia Mother     Arthritis Father     COPD Father     Heart disease Father     Hypertension Father     Vision loss Father     Cancer Neg Hx      Social History     Socioeconomic History    Marital status:      Spouse name: Not on file    Number of children: Not on file    Years of education: Not on file    Highest education level: Not on file   Social Needs    Financial resource strain: Not on file    Food insecurity - worry: Not on file    Food insecurity - inability: Not on file    Transportation needs - medical: Not on file    Transportation needs - non-medical: Not on file   Occupational History    Not on file   Tobacco Use    Smoking status: Former Smoker    Smokeless tobacco: Never Used   Substance and Sexual Activity    Alcohol use: No    Drug use: No    Sexual activity: Yes     Partners: Male   Other Topics Concern    Not on file   Social History Narrative    Not on file     Outpatient Encounter Medications as of 2/1/2019   Medication Sig Dispense Refill    amoxicillin-clavulanate 875-125mg (AUGMENTIN) 875-125 mg per tablet Take 1 tablet by mouth 2 (two) times daily. for 10 days 20 tablet 0    butalbital-acetaminophen-caffeine -40 mg  "(FIORICET, ESGIC) -40 mg per tablet Take 1 tablet by mouth every 4 (four) hours as needed for Pain.      loratadine (CLARITIN) 10 mg tablet Take 1 tablet (10 mg total) by mouth once daily.  0    [DISCONTINUED] naproxen (NAPROSYN) 375 MG tablet Take 1 tablet (375 mg total) by mouth 2 (two) times daily with meals. 30 tablet 0    [DISCONTINUED] topiramate (TOPAMAX) 100 MG tablet Take 100 mg by mouth 2 (two) times daily.       Facility-Administered Encounter Medications as of 2/1/2019   Medication Dose Route Frequency Provider Last Rate Last Dose    [COMPLETED] betamethasone acetate-betamethasone sodium phosphate injection 9 mg  9 mg Intramuscular 1 time in Clinic/HOD Beth Anglin MD   9 mg at 02/01/19 0968       Review of Systems   Constitutional: Negative for chills and fever.   HENT: Negative for congestion and facial swelling.    Eyes: Negative for discharge and itching.   Respiratory: Negative for cough and wheezing.    Cardiovascular: Negative for chest pain and palpitations.   Gastrointestinal: Negative for abdominal pain, nausea and vomiting.   Endocrine: Negative for cold intolerance and heat intolerance.   Genitourinary: Negative for dysuria and flank pain.   Musculoskeletal: Negative for myalgias and neck stiffness.   Skin: Negative for pallor and wound.   Neurological: Negative for facial asymmetry and weakness.   Psychiatric/Behavioral: Negative for agitation and suicidal ideas.       Objective:      BP (!) 150/98 (BP Location: Right arm, Patient Position: Sitting, BP Method: Medium (Manual))   Pulse 77   Temp 98.2 °F (36.8 °C) (Oral)   Ht 5' 3" (1.6 m)   Wt 69.1 kg (152 lb 5.4 oz)   SpO2 98%   BMI 26.99 kg/m²   Physical Exam   Constitutional: She is oriented to person, place, and time. She appears well-developed. No distress.   HENT:   Head: Normocephalic and atraumatic.   Right Ear: External ear normal.   Left Ear: External ear normal.   Eyes: Conjunctivae and EOM are normal.   Neck: " Neck supple. No thyromegaly present.   Cardiovascular: Normal rate and regular rhythm.   Pulmonary/Chest: Effort normal. No respiratory distress.   Abdominal: Soft. She exhibits no distension.   Genitourinary:   Genitourinary Comments: deferred   Musculoskeletal: She exhibits no edema or deformity.   Lymphadenopathy:        Head (right side): No submandibular adenopathy present.        Head (left side): No submandibular adenopathy present.     She has no cervical adenopathy.   Neurological: She is alert and oriented to person, place, and time.   Skin: Skin is warm and dry.   Psychiatric: She has a normal mood and affect. Her behavior is normal.   Nursing note and vitals reviewed.      Results for orders placed or performed during the hospital encounter of 12/10/18   CBC auto differential   Result Value Ref Range    WBC 5.59 3.90 - 12.70 K/uL    RBC 4.75 4.00 - 5.40 M/uL    Hemoglobin 13.3 12.0 - 16.0 g/dL    Hematocrit 40.8 37.0 - 48.5 %    MCV 86 82 - 98 fL    MCH 28.0 27.0 - 31.0 pg    MCHC 32.6 32.0 - 36.0 g/dL    RDW 12.8 11.5 - 14.5 %    Platelets 264 150 - 350 K/uL    MPV 10.1 9.2 - 12.9 fL    Gran # (ANC) 3.4 1.8 - 7.7 K/uL    Lymph # 1.7 1.0 - 4.8 K/uL    Mono # 0.4 0.3 - 1.0 K/uL    Eos # 0.1 0.0 - 0.5 K/uL    Baso # 0.01 0.00 - 0.20 K/uL    Gran% 61.4 38.0 - 73.0 %    Lymph% 29.5 18.0 - 48.0 %    Mono% 6.8 4.0 - 15.0 %    Eosinophil% 2.1 0.0 - 8.0 %    Basophil% 0.2 0.0 - 1.9 %    Differential Method Automated    Comprehensive metabolic panel   Result Value Ref Range    Sodium 141 136 - 145 mmol/L    Potassium 3.8 3.5 - 5.1 mmol/L    Chloride 104 95 - 110 mmol/L    CO2 27 23 - 29 mmol/L    Glucose 96 70 - 110 mg/dL    BUN, Bld 13 6 - 20 mg/dL    Creatinine 0.7 0.5 - 1.4 mg/dL    Calcium 10.0 8.7 - 10.5 mg/dL    Total Protein 7.3 6.0 - 8.4 g/dL    Albumin 4.1 3.5 - 5.2 g/dL    Total Bilirubin 0.4 0.1 - 1.0 mg/dL    Alkaline Phosphatase 66 55 - 135 U/L    AST 16 10 - 40 U/L    ALT 16 10 - 44 U/L    Anion Gap  10 8 - 16 mmol/L    eGFR if African American >60 >60 mL/min/1.73 m^2    eGFR if non African American >60 >60 mL/min/1.73 m^2   Urinalysis, Reflex to Urine Culture Urine, Clean Catch   Result Value Ref Range    Specimen UA Urine, Clean Catch     Color, UA Yellow Yellow, Straw, [place]    Appearance, UA Clear Clear    pH, UA 6.0 5.0 - 8.0    Specific Gravity, UA 1.030 1.005 - 1.030    Protein, UA Negative Negative    Glucose, UA Negative Negative    Ketones, UA Trace (A) Negative    Bilirubin (UA) Negative Negative    Occult Blood UA Trace (A) Negative    Nitrite, UA Negative Negative    Urobilinogen, UA Negative <2.0 EU/dL    Leukocytes, UA Negative Negative   Brain natriuretic peptide   Result Value Ref Range    BNP 17 0 - 99 pg/mL   CK   Result Value Ref Range    CPK 47 20 - 180 U/L   Troponin I   Result Value Ref Range    Troponin I <0.006 0.000 - 0.026 ng/mL     Assessment:       1. Sinusitis, bacterial    2. Elevated blood pressure reading without diagnosis of hypertension        Plan:   Sinusitis, bacterial  -     betamethasone acetate-betamethasone sodium phosphate injection 9 mg  -     amoxicillin-clavulanate 875-125mg (AUGMENTIN) 875-125 mg per tablet; Take 1 tablet by mouth 2 (two) times daily. for 10 days  Dispense: 20 tablet; Refill: 0  -     loratadine (CLARITIN) 10 mg tablet; Take 1 tablet (10 mg total) by mouth once daily.; Refill: 0  Stay hydrated and take meds as instructed  Elevated blood pressure reading without diagnosis of hypertension, new problem  Monitor at home and record  DASH diet advised and   Follow up with pcp for further eval      .

## 2025-02-10 ENCOUNTER — OFFICE VISIT (OUTPATIENT)
Dept: FAMILY MEDICINE | Facility: CLINIC | Age: 60
End: 2025-02-10
Payer: COMMERCIAL

## 2025-02-10 VITALS
SYSTOLIC BLOOD PRESSURE: 118 MMHG | OXYGEN SATURATION: 95 % | DIASTOLIC BLOOD PRESSURE: 70 MMHG | BODY MASS INDEX: 29.74 KG/M2 | HEART RATE: 85 BPM | HEIGHT: 62 IN | TEMPERATURE: 97 F | WEIGHT: 161.63 LBS

## 2025-02-10 DIAGNOSIS — K63.5 POLYP OF COLON, UNSPECIFIED PART OF COLON, UNSPECIFIED TYPE: ICD-10-CM

## 2025-02-10 DIAGNOSIS — I10 PRIMARY HYPERTENSION: ICD-10-CM

## 2025-02-10 DIAGNOSIS — M54.41 CHRONIC BILATERAL LOW BACK PAIN WITH RIGHT-SIDED SCIATICA: Primary | ICD-10-CM

## 2025-02-10 DIAGNOSIS — Z12.39 ENCOUNTER FOR SCREENING FOR MALIGNANT NEOPLASM OF BREAST, UNSPECIFIED SCREENING MODALITY: ICD-10-CM

## 2025-02-10 DIAGNOSIS — E78.2 MIXED HYPERLIPIDEMIA: ICD-10-CM

## 2025-02-10 DIAGNOSIS — G89.29 CHRONIC BILATERAL LOW BACK PAIN WITH RIGHT-SIDED SCIATICA: Primary | ICD-10-CM

## 2025-02-10 DIAGNOSIS — R73.03 PREDIABETES: ICD-10-CM

## 2025-02-10 PROCEDURE — 99214 OFFICE O/P EST MOD 30 MIN: CPT | Mod: S$GLB,,, | Performed by: FAMILY MEDICINE

## 2025-02-10 PROCEDURE — G2211 COMPLEX E/M VISIT ADD ON: HCPCS | Mod: S$GLB,,, | Performed by: FAMILY MEDICINE

## 2025-02-10 PROCEDURE — 3078F DIAST BP <80 MM HG: CPT | Mod: CPTII,S$GLB,, | Performed by: FAMILY MEDICINE

## 2025-02-10 PROCEDURE — 99999 PR PBB SHADOW E&M-EST. PATIENT-LVL IV: CPT | Mod: PBBFAC,,, | Performed by: FAMILY MEDICINE

## 2025-02-10 PROCEDURE — 3008F BODY MASS INDEX DOCD: CPT | Mod: CPTII,S$GLB,, | Performed by: FAMILY MEDICINE

## 2025-02-10 PROCEDURE — 3074F SYST BP LT 130 MM HG: CPT | Mod: CPTII,S$GLB,, | Performed by: FAMILY MEDICINE

## 2025-02-10 PROCEDURE — 1159F MED LIST DOCD IN RCRD: CPT | Mod: CPTII,S$GLB,, | Performed by: FAMILY MEDICINE

## 2025-02-10 RX ORDER — METHOCARBAMOL 750 MG/1
750 TABLET, FILM COATED ORAL EVERY 8 HOURS PRN
COMMUNITY
Start: 2025-01-26

## 2025-02-10 RX ORDER — DICLOFENAC POTASSIUM 50 MG/1
50 TABLET, FILM COATED ORAL 2 TIMES DAILY PRN
COMMUNITY
Start: 2025-01-26

## 2025-02-10 RX ORDER — DEXBROMPHENIRAMINE MALEATE, PHENYLEPHRINE HYDROCHLORIDE 2; 7.5 MG/1; MG/1
1 TABLET ORAL 4 TIMES DAILY PRN
COMMUNITY
Start: 2025-01-06

## 2025-02-10 NOTE — PROGRESS NOTES
Subjective:       Patient ID: Kamilla Leavitt is a 59 y.o. female.    Chief Complaint: Back Pain      HPI Comments:       Current Outpatient Medications:     ALAHIST PE 2-7.5 mg Tab, Take 1 tablet by mouth 4 (four) times daily as needed., Disp: , Rfl:     amitriptyline (ELAVIL) 25 MG tablet, TAKE 2 TABLETS BY MOUTH ONCE DAILY AT BEDTIME, Disp: , Rfl:     butalbital-acetaminophen-caffeine -40 mg (FIORICET, ESGIC) -40 mg per tablet, Take 1 tablet by mouth every 4 (four) hours as needed for Pain., Disp: 30 tablet, Rfl: 0    diclofenac (CATAFLAM) 50 MG tablet, Take 50 mg by mouth 2 (two) times daily as needed., Disp: , Rfl:     ibuprofen (ADVIL,MOTRIN) 800 MG tablet, Take 1 tablet (800 mg total) by mouth every 6 (six) hours as needed for Pain., Disp: 20 tablet, Rfl: 0    lisinopriL 10 MG tablet, Take 1 tablet (10 mg total) by mouth once daily., Disp: 90 tablet, Rfl: 3    methocarbamoL (ROBAXIN) 750 MG Tab, Take 750 mg by mouth every 8 (eight) hours as needed., Disp: , Rfl:       Recurrence of low back pain about 2 weeks ago.  Has generally had 2 or 3 episodes in the last few years.    Does not usually last less long.  Starting to gradually get better after seeing urgent care.  Was given muscle relaxants and anti-inflammatories but has stopped both of these.  Pain now 5/10.  He had helping as well.      Never saw a back specialist.  Pain goes down the right leg but she has no weakness or numbness.  No bowel or bladder incontinence.    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  Avoiding heavy lifting.  Working with her brace on.    History of scoliosis    Still doing well with Elavil at night for both sleep and headaches.  Still not able to get her mammogram or colonoscopy scheduled.  She will wait until after the pain is gone      Review of Systems   Constitutional:  Negative for activity change, appetite change and fever.   HENT:  Negative for sore throat.    Respiratory:  Negative for cough and  "shortness of breath.    Cardiovascular:  Negative for chest pain.   Gastrointestinal:  Negative for abdominal pain, diarrhea and nausea.   Genitourinary:  Negative for difficulty urinating.   Musculoskeletal:  Positive for back pain. Negative for arthralgias and myalgias.   Neurological:  Negative for dizziness and headaches.       Objective:      Vitals:    02/10/25 1449   BP: 118/70   Pulse: 85   Temp: 97.1 °F (36.2 °C)   TempSrc: Tympanic   SpO2: 95%   Weight: 73.3 kg (161 lb 9.6 oz)   Height: 5' 2" (1.575 m)   PainSc:   5   PainLoc: Back     Physical Exam  Vitals and nursing note reviewed.   Constitutional:       General: She is not in acute distress.     Appearance: She is well-developed. She is not diaphoretic.   HENT:      Head: Normocephalic.   Neck:      Thyroid: No thyromegaly.   Cardiovascular:      Rate and Rhythm: Normal rate and regular rhythm.      Heart sounds: Normal heart sounds. No murmur heard.  Pulmonary:      Effort: Pulmonary effort is normal.      Breath sounds: Normal breath sounds. No wheezing or rales.   Abdominal:      General: There is no distension.      Palpations: Abdomen is soft.   Musculoskeletal:      Cervical back: Neck supple.      Lumbar back: Tenderness and bony tenderness present. No spasms. Decreased range of motion.        Back:       Comments: Area of complaint and tenderness   Lymphadenopathy:      Cervical: No cervical adenopathy.   Skin:     General: Skin is warm and dry.   Neurological:      Mental Status: She is alert and oriented to person, place, and time.   Psychiatric:         Mood and Affect: Mood normal.         Behavior: Behavior normal.         Thought Content: Thought content normal.         Judgment: Judgment normal.         Assessment:       1. Chronic bilateral low back pain with right-sided sciatica    2. Primary hypertension    3. Prediabetes    4. Mixed hyperlipidemia    5. Polyp of colon, unspecified part of colon, unspecified type    6. Encounter for " screening for malignant neoplasm of breast, unspecified screening modality        Plan:   Chronic bilateral low back pain with right-sided sciatica  Comments:  Physical therapy.  Follow-up in April as planned  Orders:  -     Ambulatory Referral/Consult to Physical/Occupational Therapy; Future; Expected date: 02/17/2025    Primary hypertension  Comments:  Controlled.  Continue lisinopril    Prediabetes  Comments:  A1c 5.7    Mixed hyperlipidemia  Comments:      Polyp of colon, unspecified part of colon, unspecified type  Comments:  She will schedule her colonoscopy soon    Encounter for screening for malignant neoplasm of breast, unspecified screening modality  Comments:  Will get mammogram soon

## 2025-03-16 ENCOUNTER — PATIENT MESSAGE (OUTPATIENT)
Dept: FAMILY MEDICINE | Facility: CLINIC | Age: 60
End: 2025-03-16

## 2025-08-13 ENCOUNTER — PATIENT MESSAGE (OUTPATIENT)
Dept: ADMINISTRATIVE | Facility: HOSPITAL | Age: 60
End: 2025-08-13